# Patient Record
Sex: FEMALE | Race: WHITE | NOT HISPANIC OR LATINO | Employment: FULL TIME | URBAN - METROPOLITAN AREA
[De-identification: names, ages, dates, MRNs, and addresses within clinical notes are randomized per-mention and may not be internally consistent; named-entity substitution may affect disease eponyms.]

---

## 2020-06-15 ENCOUNTER — OFFICE VISIT (OUTPATIENT)
Dept: URGENT CARE | Facility: CLINIC | Age: 40
End: 2020-06-15
Payer: MEDICAID

## 2020-06-15 VITALS
OXYGEN SATURATION: 98 % | TEMPERATURE: 99 F | DIASTOLIC BLOOD PRESSURE: 85 MMHG | HEART RATE: 92 BPM | SYSTOLIC BLOOD PRESSURE: 130 MMHG

## 2020-06-15 DIAGNOSIS — R51.9 HEAD ACHE: ICD-10-CM

## 2020-06-15 DIAGNOSIS — Z20.822 CLOSE EXPOSURE TO COVID-19 VIRUS: Primary | ICD-10-CM

## 2020-06-15 PROCEDURE — 99201 PR OFFICE/OUTPT VISIT,NEW,LEVL I: ICD-10-PCS | Mod: S$GLB,,, | Performed by: FAMILY MEDICINE

## 2020-06-15 PROCEDURE — U0003 INFECTIOUS AGENT DETECTION BY NUCLEIC ACID (DNA OR RNA); SEVERE ACUTE RESPIRATORY SYNDROME CORONAVIRUS 2 (SARS-COV-2) (CORONAVIRUS DISEASE [COVID-19]), AMPLIFIED PROBE TECHNIQUE, MAKING USE OF HIGH THROUGHPUT TECHNOLOGIES AS DESCRIBED BY CMS-2020-01-R: HCPCS

## 2020-06-15 PROCEDURE — 99201 PR OFFICE/OUTPT VISIT,NEW,LEVL I: CPT | Mod: S$GLB,,, | Performed by: FAMILY MEDICINE

## 2020-06-15 RX ORDER — CLONAZEPAM 0.5 MG/1
0.5 TABLET ORAL 2 TIMES DAILY PRN
COMMUNITY

## 2020-06-15 RX ORDER — BUSPIRONE HYDROCHLORIDE 30 MG/1
30 TABLET ORAL 3 TIMES DAILY
Status: ON HOLD | COMMUNITY
End: 2020-11-27 | Stop reason: HOSPADM

## 2020-06-15 RX ORDER — LAMOTRIGINE 200 MG/1
200 TABLET ORAL NIGHTLY
Status: ON HOLD | COMMUNITY
End: 2020-11-27 | Stop reason: SDUPTHER

## 2020-06-15 RX ORDER — BUPROPION HYDROCHLORIDE 150 MG/1
300 TABLET ORAL EVERY MORNING
Status: ON HOLD | COMMUNITY
End: 2020-11-27 | Stop reason: HOSPADM

## 2020-06-15 RX ORDER — ZOLPIDEM TARTRATE 10 MG/1
5 TABLET ORAL NIGHTLY PRN
COMMUNITY

## 2020-06-15 RX ORDER — LAMOTRIGINE 100 MG/1
100 TABLET ORAL DAILY
Status: ON HOLD | COMMUNITY
End: 2020-11-27 | Stop reason: SDUPTHER

## 2020-06-15 NOTE — LETTER
Jyoti 15, 2020  Modesta Perez  [address]  [address]  [place] [address]                [facility] [place]  [address], [address]  [place] [address]  Phone: [phone]  Fax: [phone] Modesta Perez was seen and treated in our Urgent Care department on 6/15/2020. She may return to work in 2 - 3 days.      If you have any questions or concerns, please don't hesitate to call.        Sincerely,        Favio Blanton MD

## 2020-06-15 NOTE — PATIENT INSTRUCTIONS
Please drink plenty of fluids.  Please get plenty of rest.  Please return here or go to the Emergency Department for any concerns or worsening of condition.  If you were given wait & see antibiotics, please wait 3-5 days before taking them, and only take them if your symptoms have worsened or not improved.  If you do begin taking the antibiotics, please take them to completion.  If you were prescribed antibiotics, please take them to completion.  If you were prescribed a narcotic medication, do not drive or operate heavy equipment or machinery while taking these medications.      If not allergic, please take over the counter Tylenol (Acetaminophen) and/or Motrin (Ibuprofen) as directed for control of pain and/or fever.    Please follow up with your primary care doctor or specialist as needed.  Primary Doctor No  None    You must understand that you have received treatment at an Urgent Care facility only, and that you may be  released before all of your medical problems are known or treated. Urgent Care facilities are not equipped to  handle life threatening emergencies. It is recommended that you seek care at an Emergency Department for  further evaluation of worsening or concerning symptoms, or possibly life threatening conditions as  discussed.    Instructions for Patients with Confirmed or Suspected COVID-19    If you are awaiting your test result, you will either be called or it will be released to the patient portal.  If you have any questions about your test, please visit [email or web address] or call our COVID-19 information line at [phone].      Preventing the Spread of Coronavirus Disease 2019 (COVID-19) in Homes and Residential Communities -- Patients     Prevention steps for people with confirmed or suspected COVID-19 (including persons under investigation) who do not need to be hospitalized and people with confirmed COVID-19 who were hospitalized and determined to be medically stable to go  home.    Stay home except to get medical care.    Separate yourself from other people and animals in your home.    Call ahead before visiting your doctor.    Wear a face mask.    Cover your coughs and sneezes.    Clean your hands often.    Avoid sharing personal household items.    Clean all high-touch surfaces every day.    Monitor your symptoms. Seek prompt medical attention if your illness is worsening (e.g., difficulty breathing). Before seeking care, call your healthcare provider.    If you have a medical emergency and must call 911, notify the dispatcher that you have or are being evaluated for COVID-19. If possible, put on a face mask before emergency medical services arrive.    Use the following symptom-based strategy to return to normal activity following a suspected or confirmed case of COVID-19. Continue isolation until:   o At least 3 days (72 hours) have passed since recovery defined as resolution of fever without the use of fever-reducing medications and improvement in respiratory symptoms (e.g. cough, shortness of breath), and   o At least 10 days have passed since symptoms first appeared.     Precautions for household members, intimate partners and caregivers in a non-healthcare setting of a patient with symptomatic laboratory-confirmed COVID-19 or a patient under investigation.     Household members, intimate partners and caregivers in a non-healthcare setting may have close contact with a person with symptomatic, laboratory-confirmed COVID-19 or a person under investigation. Close contacts should monitor their health; they should call their healthcare provider right away if they develop symptoms suggestive of COVID-19 (e.g., fever, cough, shortness of breath). Close contacts should also follow these recommendations:      Make sure that you understand and can help the patient follow their healthcare providers instructions for medication(s) and care. You should help the patient with  basic needs in the home and provide support for getting groceries, prescriptions, and other personal needs.    Monitor the patients symptoms. If the patient is getting sicker, call his or her healthcare provider and tell them that the patient has laboratory-confirmed COVID-19. This will help the healthcare providers office take steps to keep people in the office or waiting room from getting infected. Ask the healthcare provider to call the local or [facility] health department for additional guidance. If the patient has a medical emergency and you need to call 911, notify the dispatch personnel that the patient has or is being evaluated for COVID-19.    Household members should stay in another room or be  from the patient as much as possible. Household members should use a separate bedroom and bathroom, if available.    Prohibit visitors who do not have an essential need to be in the home.    Household members should care for any pets. Do not handle pets or other animals while sick.    Make sure that shared spaces in the home have good air flow, such as by an air conditioner.    Perform hand hygiene frequently. Wash your hands often with soap and water for at least 20 seconds or use an alcohol-based hand  that contains 60 to 95% alcohol, covering all surfaces of your hands and rubbing them together until they feel dry. Soap and water are preferred if hands are visibly dirty.    Avoid touching your eyes, nose and mouth with unwashed hands.    The patient should wear a face mask when you are around other people. If the patient is not able to wear a face mask (for example, because it causes trouble breathing), you, as the caregiver, should wear a mask when you are in the same room as the patient.    Wear a disposable face mask and gloves when you touch or have contact with the patients blood, stool or body fluids, such as saliva, sputum, nasal mucus, vomit and urine.   o Throw out disposable  face masks and gloves after using them. Do not reuse.   o When removing personal protective equipment, first remove and dispose of gloves. Then, immediately clean your hands with soap and water or alcohol-based hand . Next, remove and dispose of face mask, and immediately clean your hands again with soap and water or alcohol-based hand .    Avoid sharing household items with the patient. You should not share dishes, drinking glasses, cups, eating utensils, towels, bedding or other items. After the patient uses these items, you should wash them thoroughly (see below Wash laundry thoroughly).    Clean all high-touch surfaces, such as counters, tabletops, doorknobs, bathroom fixtures, toilets, phones, keyboards, tablets and bedside tables, every day. Also, clean any surfaces that may have blood, stool or body fluids on them.   o Use a household cleaning spray or wipe, according to the label instructions. Labels contain instructions for safe and effective use of the cleaning product including precautions you should take when applying the product, such as wearing gloves and making sure you have good ventilation during use of the product.    Wash laundry thoroughly.   o Immediately remove and wash clothes or bedding that have blood, stool or body fluids on them.  o Wear disposable gloves while handling soiled items and keep soiled items away from your body. Clean your hands (with soap and water or an alcohol-based hand ) immediately after removing your gloves.   o Read and follow directions on labels of laundry or clothing items and detergent. In general, using a normal laundry detergent according to washing machine instructions and dry thoroughly using the warmest temperatures recommended on the clothing label.    Place all used disposable gloves, face masks and other contaminated items in a lined container before disposing of them with other household waste. Clean your hands (with soap  and water or an alcohol-based hand ) immediately after handling these items. Soap and water should be used preferentially if hands are visibly dirty.   Discuss any additional questions with your state or local health department

## 2020-06-15 NOTE — PROGRESS NOTES
Subjective:       Patient ID: Modesta Perez is a 39 y.o. female.    Vitals:  vitals were not taken for this visit.     Chief Complaint: COVID-19 Concerns    Pt states she was exposed to a positive covid.    Other  This is a new problem. Episode onset: 3 days. Associated symptoms include headaches and nausea. Pertinent negatives include no chills, congestion, coughing, fatigue, fever, sore throat or vomiting.       Constitution: Negative for chills, fatigue and fever.   HENT: Negative for congestion and sore throat.    Eyes: Negative for double vision and blurred vision.   Respiratory: Negative for cough and shortness of breath.    Gastrointestinal: Positive for nausea. Negative for vomiting and diarrhea.   Neurological: Positive for headaches. Negative for dizziness and light-headedness.       Objective:      Physical Exam   Constitutional: She is oriented to person, place, and time. She appears well-developed. She is cooperative.  Non-toxic appearance. She does not appear ill. No distress.   HENT:   Head: Normocephalic and atraumatic.   Right Ear: Hearing, tympanic membrane, external ear and ear canal normal.   Left Ear: Hearing, tympanic membrane, external ear and ear canal normal.   Nose: No mucosal edema, rhinorrhea or nasal deformity. No epistaxis. Right sinus exhibits no maxillary sinus tenderness and no frontal sinus tenderness. Left sinus exhibits no maxillary sinus tenderness and no frontal sinus tenderness.   Mouth/Throat: Uvula is midline and mucous membranes are normal. No trismus in the jaw. Normal dentition. No uvula swelling. Posterior oropharyngeal edema and posterior oropharyngeal erythema present. No oropharyngeal exudate.   Eyes: Conjunctivae and lids are normal. No scleral icterus.   Neck: Trachea normal, full passive range of motion without pain and phonation normal. Neck supple. No neck rigidity. No edema and no erythema present.   Cardiovascular: Normal rate, regular rhythm, normal  heart sounds and normal pulses.   Pulmonary/Chest: Effort normal and breath sounds normal. No respiratory distress. She has no decreased breath sounds. She has no rhonchi.   Abdominal: Normal appearance.   Musculoskeletal: Normal range of motion.         General: No deformity.   Neurological: She is alert and oriented to person, place, and time. She exhibits normal muscle tone. Coordination normal.   Skin: Skin is warm, dry, intact, not diaphoretic and not pale.   Psychiatric: Her speech is normal and behavior is normal. Judgment and thought content normal.   Nursing note and vitals reviewed.        Assessment:       1. Close Exposure to Covid-19 Virus    2. Head ache        Plan:         Close Exposure to Covid-19 Virus  -     Airborne and Contact and Droplet Isolation Status; Standing    Head ache  -     COVID-19 Routine Screening     Please drink plenty of fluids.  Please get plenty of rest.  Please return here or go to the Emergency Department for any concerns or worsening of condition.  If you were given wait & see antibiotics, please wait 3-5 days before taking them, and only take them if your symptoms have worsened or not improved.  If you do begin taking the antibiotics, please take them to completion.  If you were prescribed antibiotics, please take them to completion.  If you were prescribed a narcotic medication, do not drive or operate heavy equipment or machinery while taking these medications.      If not allergic, please take over the counter Tylenol (Acetaminophen) and/or Motrin (Ibuprofen) as directed for control of pain and/or fever.    Please follow up with your primary care doctor or specialist as needed.  Primary Doctor No  None    You must understand that you have received treatment at an Urgent Care facility only, and that you may be  released before all of your medical problems are known or treated. Urgent Care facilities are not equipped to  handle life threatening emergencies. It is  recommended that you seek care at an Emergency Department for  further evaluation of worsening or concerning symptoms, or possibly life threatening conditions as  discussed.

## 2020-06-17 LAB — SARS-COV-2 RNA RESP QL NAA+PROBE: NOT DETECTED

## 2020-06-18 ENCOUNTER — TELEPHONE (OUTPATIENT)
Dept: URGENT CARE | Facility: CLINIC | Age: 40
End: 2020-06-18

## 2020-06-18 NOTE — TELEPHONE ENCOUNTER
Pt called saying she received an e-mail about results being available. She was informed of her negative Covid results.

## 2020-11-22 ENCOUNTER — HOSPITAL ENCOUNTER (INPATIENT)
Facility: HOSPITAL | Age: 40
LOS: 5 days | Discharge: HOME | DRG: 885 | End: 2020-11-27
Attending: PSYCHIATRY & NEUROLOGY | Admitting: PSYCHIATRY & NEUROLOGY
Payer: MEDICAID

## 2020-11-22 DIAGNOSIS — F20.9 SCHIZOPHRENIA: ICD-10-CM

## 2020-11-22 DIAGNOSIS — F25.1 SCHIZOAFFECTIVE DISORDER, DEPRESSIVE TYPE: Primary | ICD-10-CM

## 2020-11-22 DIAGNOSIS — F31.9 BIPOLAR 1 DISORDER: ICD-10-CM

## 2020-11-22 PROCEDURE — 25000003 PHARM REV CODE 250: Performed by: PSYCHIATRY & NEUROLOGY

## 2020-11-22 PROCEDURE — 80175 DRUG SCREEN QUAN LAMOTRIGINE: CPT

## 2020-11-22 PROCEDURE — 80061 LIPID PANEL: CPT

## 2020-11-22 PROCEDURE — 11400000 HC PSYCH PRIVATE ROOM

## 2020-11-22 PROCEDURE — 83036 HEMOGLOBIN GLYCOSYLATED A1C: CPT

## 2020-11-22 RX ORDER — BUPROPION HYDROCHLORIDE 300 MG/1
300 TABLET ORAL EVERY MORNING
Status: DISCONTINUED | OUTPATIENT
Start: 2020-11-23 | End: 2020-11-27 | Stop reason: HOSPADM

## 2020-11-22 RX ORDER — OLANZAPINE 10 MG/2ML
10 INJECTION, POWDER, FOR SOLUTION INTRAMUSCULAR EVERY 4 HOURS PRN
Status: DISCONTINUED | OUTPATIENT
Start: 2020-11-22 | End: 2020-11-27 | Stop reason: HOSPADM

## 2020-11-22 RX ORDER — MAG HYDROX/ALUMINUM HYD/SIMETH 200-200-20
30 SUSPENSION, ORAL (FINAL DOSE FORM) ORAL EVERY 6 HOURS PRN
Status: DISCONTINUED | OUTPATIENT
Start: 2020-11-22 | End: 2020-11-27 | Stop reason: HOSPADM

## 2020-11-22 RX ORDER — BUSPIRONE HYDROCHLORIDE 10 MG/1
30 TABLET ORAL 2 TIMES DAILY
Status: DISCONTINUED | OUTPATIENT
Start: 2020-11-22 | End: 2020-11-27 | Stop reason: HOSPADM

## 2020-11-22 RX ORDER — CLONAZEPAM 0.5 MG/1
0.5 TABLET ORAL 2 TIMES DAILY PRN
Status: DISCONTINUED | OUTPATIENT
Start: 2020-11-22 | End: 2020-11-23

## 2020-11-22 RX ORDER — LOPERAMIDE HYDROCHLORIDE 2 MG/1
2 CAPSULE ORAL
Status: DISCONTINUED | OUTPATIENT
Start: 2020-11-22 | End: 2020-11-27 | Stop reason: HOSPADM

## 2020-11-22 RX ORDER — ZOLPIDEM TARTRATE 5 MG/1
5 TABLET ORAL NIGHTLY PRN
Status: DISCONTINUED | OUTPATIENT
Start: 2020-11-22 | End: 2020-11-23

## 2020-11-22 RX ORDER — LAMOTRIGINE 100 MG/1
200 TABLET ORAL NIGHTLY
Status: DISCONTINUED | OUTPATIENT
Start: 2020-11-22 | End: 2020-11-27 | Stop reason: HOSPADM

## 2020-11-22 RX ORDER — LAMOTRIGINE 100 MG/1
100 TABLET ORAL DAILY
Status: DISCONTINUED | OUTPATIENT
Start: 2020-11-23 | End: 2020-11-27 | Stop reason: HOSPADM

## 2020-11-22 RX ORDER — FOLIC ACID 1 MG/1
1 TABLET ORAL DAILY
Status: DISCONTINUED | OUTPATIENT
Start: 2020-11-23 | End: 2020-11-27 | Stop reason: HOSPADM

## 2020-11-22 RX ORDER — DOCUSATE SODIUM 100 MG/1
100 CAPSULE, LIQUID FILLED ORAL DAILY PRN
Status: DISCONTINUED | OUTPATIENT
Start: 2020-11-22 | End: 2020-11-27 | Stop reason: HOSPADM

## 2020-11-22 RX ORDER — HYDROXYZINE PAMOATE 50 MG/1
50 CAPSULE ORAL EVERY 6 HOURS PRN
Status: DISCONTINUED | OUTPATIENT
Start: 2020-11-22 | End: 2020-11-27 | Stop reason: HOSPADM

## 2020-11-22 RX ORDER — LURASIDONE HYDROCHLORIDE 40 MG/1
80 TABLET, FILM COATED ORAL
Status: DISCONTINUED | OUTPATIENT
Start: 2020-11-22 | End: 2020-11-23

## 2020-11-22 RX ORDER — OLANZAPINE 10 MG/1
10 TABLET ORAL EVERY 4 HOURS PRN
Status: DISCONTINUED | OUTPATIENT
Start: 2020-11-22 | End: 2020-11-27 | Stop reason: HOSPADM

## 2020-11-22 RX ORDER — ACETAMINOPHEN 325 MG/1
650 TABLET ORAL EVERY 6 HOURS PRN
Status: DISCONTINUED | OUTPATIENT
Start: 2020-11-22 | End: 2020-11-27 | Stop reason: HOSPADM

## 2020-11-22 RX ADMIN — BUSPIRONE HYDROCHLORIDE 30 MG: 10 TABLET ORAL at 08:11

## 2020-11-22 RX ADMIN — LAMOTRIGINE 200 MG: 100 TABLET ORAL at 08:11

## 2020-11-22 RX ADMIN — LURASIDONE HYDROCHLORIDE 80 MG: 40 TABLET, FILM COATED ORAL at 05:11

## 2020-11-22 NOTE — PLAN OF CARE
Admit Note:  Pt received from [facility] aleida DORSEY'willian by Dr Chavez.  Escorted to [facility] by tech and security via wheelchair.  Pt wanded and ambulatory on unit.  VSS.  Skin assessment/body search complete, nothing reported.  Pt cooperative during admission assessment.  Denies any S/I or H/I at this time.  Does admit to hearing voices and states she is compliant with her medications but feels they need adjusting.  Slightly suspicious maybe slightly guarded as well.  Pt has a history of inpatient psych admits in the past with a history of bipolar disorder.  Pt sees Dr Baugh for her outpatient psychiatrist.  Pt is otherwise compliant and following all instructions.  Pt oriented to unit.  Explained unit rules to pt, and instructed her to inform staff of any needs or concerns.  Pt verbalized understanding.  Will continue to monitor mood and behavior.

## 2020-11-23 LAB
CHOLEST SERPL-MCNC: 162 MG/DL (ref 120–199)
CHOLEST/HDLC SERPL: 2.5 {RATIO} (ref 2–5)
ESTIMATED AVG GLUCOSE: 82 MG/DL (ref 68–131)
HBA1C MFR BLD HPLC: 4.5 % (ref 4–5.6)
HDLC SERPL-MCNC: 64 MG/DL (ref 40–75)
HDLC SERPL: 39.5 % (ref 20–50)
LDLC SERPL CALC-MCNC: 85 MG/DL (ref 63–159)
NONHDLC SERPL-MCNC: 98 MG/DL
TRIGL SERPL-MCNC: 65 MG/DL (ref 30–150)

## 2020-11-23 PROCEDURE — 25000003 PHARM REV CODE 250: Performed by: PSYCHIATRY & NEUROLOGY

## 2020-11-23 PROCEDURE — 90833 PSYTX W PT W E/M 30 MIN: CPT | Mod: AF,HB,, | Performed by: PSYCHIATRY & NEUROLOGY

## 2020-11-23 PROCEDURE — 99222 1ST HOSP IP/OBS MODERATE 55: CPT | Mod: ,,, | Performed by: INTERNAL MEDICINE

## 2020-11-23 PROCEDURE — 99221 1ST HOSP IP/OBS SF/LOW 40: CPT | Mod: ,,, | Performed by: NURSE PRACTITIONER

## 2020-11-23 PROCEDURE — 11400000 HC PSYCH PRIVATE ROOM

## 2020-11-23 PROCEDURE — 99223 1ST HOSP IP/OBS HIGH 75: CPT | Mod: AF,HB,, | Performed by: PSYCHIATRY & NEUROLOGY

## 2020-11-23 PROCEDURE — 99221 PR INITIAL HOSPITAL CARE,LEVL I: ICD-10-PCS | Mod: ,,, | Performed by: NURSE PRACTITIONER

## 2020-11-23 PROCEDURE — 99222 PR INITIAL HOSPITAL CARE,LEVL II: ICD-10-PCS | Mod: ,,, | Performed by: INTERNAL MEDICINE

## 2020-11-23 PROCEDURE — 99223 PR INITIAL HOSPITAL CARE,LEVL III: ICD-10-PCS | Mod: AF,HB,, | Performed by: PSYCHIATRY & NEUROLOGY

## 2020-11-23 PROCEDURE — 90833 PR PSYCHOTHERAPY W/PATIENT W/E&M, 30 MIN (ADD ON): ICD-10-PCS | Mod: AF,HB,, | Performed by: PSYCHIATRY & NEUROLOGY

## 2020-11-23 PROCEDURE — 36415 COLL VENOUS BLD VENIPUNCTURE: CPT

## 2020-11-23 RX ORDER — ZOLPIDEM TARTRATE 5 MG/1
5 TABLET ORAL NIGHTLY
Status: DISCONTINUED | OUTPATIENT
Start: 2020-11-23 | End: 2020-11-27 | Stop reason: HOSPADM

## 2020-11-23 RX ORDER — CLONAZEPAM 0.5 MG/1
0.5 TABLET ORAL 2 TIMES DAILY
Status: DISCONTINUED | OUTPATIENT
Start: 2020-11-23 | End: 2020-11-27 | Stop reason: HOSPADM

## 2020-11-23 RX ORDER — ASPIRIN 325 MG
50000 TABLET, DELAYED RELEASE (ENTERIC COATED) ORAL
Status: DISCONTINUED | OUTPATIENT
Start: 2020-11-23 | End: 2020-11-27 | Stop reason: HOSPADM

## 2020-11-23 RX ADMIN — FOLIC ACID 1 MG: 1 TABLET ORAL at 09:11

## 2020-11-23 RX ADMIN — ZOLPIDEM TARTRATE 5 MG: 5 TABLET ORAL at 08:11

## 2020-11-23 RX ADMIN — CLONAZEPAM 0.5 MG: 0.5 TABLET ORAL at 08:11

## 2020-11-23 RX ADMIN — LAMOTRIGINE 100 MG: 100 TABLET ORAL at 09:11

## 2020-11-23 RX ADMIN — OFLOXACIN 50000 UNITS: 300 TABLET, COATED ORAL at 11:11

## 2020-11-23 RX ADMIN — LURASIDONE HYDROCHLORIDE 100 MG: 40 TABLET, FILM COATED ORAL at 05:11

## 2020-11-23 RX ADMIN — BUSPIRONE HYDROCHLORIDE 30 MG: 10 TABLET ORAL at 08:11

## 2020-11-23 RX ADMIN — BUPROPION HYDROCHLORIDE 300 MG: 300 TABLET, FILM COATED, EXTENDED RELEASE ORAL at 06:11

## 2020-11-23 RX ADMIN — CLONAZEPAM 0.5 MG: 0.5 TABLET ORAL at 11:11

## 2020-11-23 RX ADMIN — LAMOTRIGINE 200 MG: 100 TABLET ORAL at 08:11

## 2020-11-23 RX ADMIN — THERA TABS 1 TABLET: TAB at 09:11

## 2020-11-23 RX ADMIN — BUSPIRONE HYDROCHLORIDE 30 MG: 10 TABLET ORAL at 09:11

## 2020-11-23 NOTE — PLAN OF CARE
Pt lying quiet in bed, eyes closed, respiration even and unlabored, appearing asleep.  Slept well most all shift since 2200.  Up to bathroom at 0145 then back to sleep by 0230 and again briefly awake at 0415 when new room mate was brought into room. Safety and precautions maintained with rounds every 15 minutes, bed is fixed in a low position and pathways kept clear.  No fall occurred.

## 2020-11-23 NOTE — PLAN OF CARE
Behavior:  The patient attended psychotherapy group today. She was dressed in her own clothes and wearing a mask.    Intervention:  The Five Stages of Change was discussed in psychotherapy group this date. Patients identified what stages of change they believed that they are in using handouts P/C/P -1.2 and P/C/P - 1.1 from the Group Therapy for Substance Abuse, second edition, 2016. A Stages of Change Manual. Group discussion was then had about whether patients had a change in their life that they were thinking of making, whether related to substance abuse or not, and what stage of change they are in.    Response:  The patient was unable to participate in group today due to the psychiatrist coming to get her during group.    Plan:   To continue to encourage patient to attend group psychotherapy and to learn more about the 5 Stages of Change.

## 2020-11-23 NOTE — PROGRESS NOTES
"   11/23/20 1040   [facility] Group Therapy   Group Name Therapeutic Recreation   Specific Interventions Cognitive Stimulation Training   Participation Level Appropriate   Participation Quality Cooperative   Insight/Motivation Good   Affect/Mood Display Anxious   Cognition Alert   Psychomotor WNL   Patient reports "little anxious because I here for something simple" mood. Patient says "I didn't get a puppy, when I didn't get a puppy, I was depressed, arguing with my boyfriend, had suicidal thoughts to kill myself." Patient states "I need positive coping mechanisms.  "

## 2020-11-23 NOTE — SUBJECTIVE & OBJECTIVE
Past Medical History:   Diagnosis Date    Anxiety     Bipolar disorder        Past Surgical History:   Procedure Laterality Date    HERNIA REPAIR      HYSTERECTOMY      TUBAL LIGATION         Review of patient's allergies indicates:   Allergen Reactions    Latex, natural rubber Rash       Current Facility-Administered Medications on File Prior to Encounter   Medication    [DISCONTINUED] diphenhydrAMINE injection 50 mg    [DISCONTINUED] haloperidol lactate injection 5 mg    [DISCONTINUED] lorazepam injection 2 mg     Current Outpatient Medications on File Prior to Encounter   Medication Sig    biotin 10,000 mcg TbDL Take by mouth.    buPROPion (WELLBUTRIN XL) 150 MG TB24 tablet Take 300 mg by mouth every morning.     busPIRone (BUSPAR) 30 MG Tab Take 30 mg by mouth 3 (three) times daily.    clonazePAM (KLONOPIN) 0.5 MG tablet Take 0.5 mg by mouth 2 (two) times daily as needed for Anxiety.    lamoTRIgine (LAMICTAL) 100 MG tablet Take 100 mg by mouth once daily.    lamoTRIgine (LAMICTAL) 200 MG tablet Take 200 mg by mouth every evening.    lurasidone (LATUDA) 80 mg Tab tablet Take 80 mg by mouth every evening.    zolpidem (AMBIEN) 10 mg Tab Take 5 mg by mouth nightly as needed.     Family History     Problem Relation (Age of Onset)    Cancer Father    Diabetes Father    Hyperlipidemia Father    Hypertension Father        Tobacco Use    Smoking status: Former Smoker   Substance and Sexual Activity    Alcohol use: Not on file    Drug use: Not on file    Sexual activity: Not on file     Review of Systems   Constitutional: Negative for chills, fatigue, fever and unexpected weight change.   HENT: Negative for congestion, ear pain, sore throat and trouble swallowing.    Eyes: Negative for pain and visual disturbance.   Respiratory: Negative for cough, chest tightness and shortness of breath.    Cardiovascular: Negative for chest pain, palpitations and leg swelling.   Gastrointestinal: Negative for  abdominal distention, abdominal pain, constipation, diarrhea and vomiting.   Genitourinary: Negative for difficulty urinating, dysuria, flank pain, frequency and hematuria.   Musculoskeletal: Negative for back pain, gait problem, joint swelling, neck pain and neck stiffness.   Skin: Negative for rash and wound.   Neurological: Negative for dizziness, seizures, speech difficulty, light-headedness and headaches.   Psychiatric/Behavioral: Positive for dysphoric mood and hallucinations.     Objective:     Vital Signs (Most Recent):  Temp: 99 °F (37.2 °C) (11/23/20 0809)  Pulse: 70 (11/23/20 0809)  Resp: 18 (11/23/20 0809)  BP: 112/70 (11/23/20 0809)  SpO2: 100 % (11/22/20 1700) Vital Signs (24h Range):  Temp:  [97.8 °F (36.6 °C)-99 °F (37.2 °C)] 99 °F (37.2 °C)  Pulse:  [70-88] 70  Resp:  [18-20] 18  SpO2:  [100 %] 100 %  BP: (109-123)/(70-82) 112/70     Weight: 60.8 kg (134 lb 0.6 oz)  Body mass index is 22.31 kg/m².    Physical Exam  Vitals signs reviewed.   Constitutional:       Appearance: She is well-developed.   HENT:      Head: Normocephalic and atraumatic.   Neck:      Thyroid: No thyromegaly.   Cardiovascular:      Rate and Rhythm: Normal rate and regular rhythm.      Heart sounds: Normal heart sounds. No murmur.   Pulmonary:      Effort: Pulmonary effort is normal. No respiratory distress.      Breath sounds: Normal breath sounds. No wheezing or rales.   Abdominal:      General: Bowel sounds are normal. There is no distension.      Palpations: Abdomen is soft. There is no mass.      Tenderness: There is no abdominal tenderness.   Musculoskeletal: Normal range of motion.   Lymphadenopathy:      Cervical: No cervical adenopathy.   Skin:     General: Skin is warm and dry.      Findings: No erythema.   Neurological:      Mental Status: She is alert and oriented to person, place, and time.      Comments:   Neuro: Cranial nerves:  CN II Visual fields full to confrontation.   CN III, IV, VI Pupils are equal, round,  and reactive to light.  CN III: no palsy  Nystagmus: none   Diplopia: none  Ophthalmoparesis: none  CN V Facial sensation intact.   CN VII Facial expression full, symmetric.   CN VIII normal.   CN IX normal.   CN X normal.   CN XI normal.   CN XII normal.               Significant Labs:   UPT negative     U/A negative   UDS  Results for orders placed or performed during the hospital encounter of 11/22/20   Drug screen panel, emergency   Result Value Ref Range    Benzodiazepines Presumptive Positive     Methadone metabolites Negative     Cocaine (Metab.) Negative     Opiate Scrn, Ur Negative     Barbiturate Screen, Ur Negative     Amphetamine Screen, Ur Negative     THC Negative     Phencyclidine Negative     Creatinine, Urine 157.7 15.0 - 325.0 mg/dL    Toxicology Information SEE COMMENT      CBC  Results for orders placed or performed during the hospital encounter of 11/22/20   CBC Auto Differential   Result Value Ref Range    WBC 9.01 3.90 - 12.70 K/uL    RBC 4.12 4.00 - 5.40 M/uL    Hemoglobin 12.7 12.0 - 16.0 g/dL    Hematocrit 37.3 37.0 - 48.5 %    MCV 91 82 - 98 fL    MCH 30.8 27.0 - 31.0 pg    MCHC 34.0 32.0 - 36.0 g/dL    RDW 12.2 11.5 - 14.5 %    Platelets 327 150 - 350 K/uL    MPV 8.8 (L) 9.2 - 12.9 fL    Immature Granulocytes 0.3 0.0 - 0.5 %    Gran # (ANC) 5.7 1.8 - 7.7 K/uL    Immature Grans (Abs) 0.03 0.00 - 0.04 K/uL    Lymph # 2.4 1.0 - 4.8 K/uL    Mono # 0.6 0.3 - 1.0 K/uL    Eos # 0.3 0.0 - 0.5 K/uL    Baso # 0.04 0.00 - 0.20 K/uL    nRBC 0 0 /100 WBC    Gran % 62.7 38.0 - 73.0 %    Lymph % 26.9 18.0 - 48.0 %    Mono % 6.8 4.0 - 15.0 %    Eosinophil % 2.9 0.0 - 8.0 %    Basophil % 0.4 0.0 - 1.9 %    Differential Method Automated      CMP  Results for orders placed or performed during the hospital encounter of 11/22/20   Comprehensive Metabolic Panel   Result Value Ref Range    Sodium 142 136 - 145 mmol/L    Potassium 3.9 3.5 - 5.1 mmol/L    Chloride 108 95 - 110 mmol/L    CO2 25 23 - 29 mmol/L     Glucose 90 70 - 110 mg/dL    BUN 13 6 - 20 mg/dL    Creatinine 1.0 0.5 - 1.4 mg/dL    Calcium 9.0 8.7 - 10.5 mg/dL    Total Protein 7.2 6.0 - 8.4 g/dL    Albumin 4.5 3.5 - 5.2 g/dL    Total Bilirubin 0.4 0.1 - 1.0 mg/dL    Alkaline Phosphatase 47 (L) 55 - 135 U/L    AST 14 10 - 40 U/L    ALT 21 10 - 44 U/L    Anion Gap 9 8 - 16 mmol/L    eGFR if African American >60 >60 mL/min/1.73 m^2    eGFR if non African American >60 >60 mL/min/1.73 m^2     TSH  Results for orders placed or performed during the hospital encounter of 11/22/20   TSH   Result Value Ref Range    TSH 1.819 0.400 - 4.000 uIU/mL     ETOH  Results for orders placed or performed during the hospital encounter of 11/22/20   Ethanol   Result Value Ref Range    Alcohol, Serum <10 <10 mg/dL     Salicylate  Results for orders placed or performed during the hospital encounter of 11/22/20   Salicylate Level   Result Value Ref Range    Salicylate Lvl <5.0 (L) 15.0 - 30.0 mg/dL     Acetaminophen  Results for orders placed or performed during the hospital encounter of 11/22/20   Acetaminophen Level   Result Value Ref Range    Acetaminophen (Tylenol), Serum <3.0 (L) 10.0 - 20.0 ug/mL

## 2020-11-23 NOTE — HPI
38 yo female patient with hx of bipolar and hallucinations. Admitted to U and medicine was consulted for H/P. VSS/afebrile. Labs reviewed

## 2020-11-23 NOTE — CONSULTS
[facility] Medicine  Consult Note    Patient Name: Modesta Perez  MRN: [id number]  Admission Date: 11/22/2020  Hospital Length of Stay: 1 days  Attending Physician: Sundar Cantu MD   Primary Care Provider: Primary Doctor No           Patient information was obtained from patient and ER records.     Inpatient consult to [facility] for History and Physical  Consult performed by: Christin Méndez NP  Consult ordered by: Sundar Cantu MD        Subjective:     Principal Problem: Schizophrenia    Chief Complaint: No chief complaint on file.       HPI: 38 yo female patient with hx of bipolar and hallucinations. Admitted to [facility] and medicine was consulted for H/P. VSS/afebrile. Labs reviewed    Past Medical History:   Diagnosis Date    Anxiety     Bipolar disorder        Past Surgical History:   Procedure Laterality Date    HERNIA REPAIR      HYSTERECTOMY      TUBAL LIGATION         Review of patient's allergies indicates:   Allergen Reactions    Latex, natural rubber Rash       Current Facility-Administered Medications on File Prior to Encounter   Medication    [DISCONTINUED] diphenhydrAMINE injection 50 mg    [DISCONTINUED] haloperidol lactate injection 5 mg    [DISCONTINUED] lorazepam injection 2 mg     Current Outpatient Medications on File Prior to Encounter   Medication Sig    biotin 10,000 mcg TbDL Take by mouth.    buPROPion (WELLBUTRIN XL) 150 MG TB24 tablet Take 300 mg by mouth every morning.     busPIRone (BUSPAR) 30 MG Tab Take 30 mg by mouth 3 (three) times daily.    clonazePAM (KLONOPIN) 0.5 MG tablet Take 0.5 mg by mouth 2 (two) times daily as needed for Anxiety.    lamoTRIgine (LAMICTAL) 100 MG tablet Take 100 mg by mouth once daily.    lamoTRIgine (LAMICTAL) 200 MG tablet Take 200 mg by mouth every evening.    lurasidone (LATUDA) 80 mg Tab tablet Take 80 mg by mouth every evening.    zolpidem (AMBIEN) 10 mg Tab Take 5 mg by mouth  nightly as needed.     Family History     Problem Relation (Age of Onset)    Cancer Father    Diabetes Father    Hyperlipidemia Father    Hypertension Father        Tobacco Use    Smoking status: Former Smoker   Substance and Sexual Activity    Alcohol use: Not on file    Drug use: Not on file    Sexual activity: Not on file     Review of Systems   Constitutional: Negative for chills, fatigue, fever and unexpected weight change.   HENT: Negative for congestion, ear pain, sore throat and trouble swallowing.    Eyes: Negative for pain and visual disturbance.   Respiratory: Negative for cough, chest tightness and shortness of breath.    Cardiovascular: Negative for chest pain, palpitations and leg swelling.   Gastrointestinal: Negative for abdominal distention, abdominal pain, constipation, diarrhea and vomiting.   Genitourinary: Negative for difficulty urinating, dysuria, flank pain, frequency and hematuria.   Musculoskeletal: Negative for back pain, gait problem, joint swelling, neck pain and neck stiffness.   Skin: Negative for rash and wound.   Neurological: Negative for dizziness, seizures, speech difficulty, light-headedness and headaches.   Psychiatric/Behavioral: Positive for dysphoric mood and hallucinations.     Objective:     Vital Signs (Most Recent):  Temp: 99 °F (37.2 °C) (11/23/20 0809)  Pulse: 70 (11/23/20 0809)  Resp: 18 (11/23/20 0809)  BP: 112/70 (11/23/20 0809)  SpO2: 100 % (11/22/20 1700) Vital Signs (24h Range):  Temp:  [97.8 °F (36.6 °C)-99 °F (37.2 °C)] 99 °F (37.2 °C)  Pulse:  [70-88] 70  Resp:  [18-20] 18  SpO2:  [100 %] 100 %  BP: (109-123)/(70-82) 112/70     Weight: 60.8 kg (134 lb 0.6 oz)  Body mass index is 22.31 kg/m².    Physical Exam  Vitals signs reviewed.   Constitutional:       Appearance: She is well-developed.   HENT:      Head: Normocephalic and atraumatic.   Neck:      Thyroid: No thyromegaly.   Cardiovascular:      Rate and Rhythm: Normal rate and regular rhythm.      Heart  sounds: Normal heart sounds. No murmur.   Pulmonary:      Effort: Pulmonary effort is normal. No respiratory distress.      Breath sounds: Normal breath sounds. No wheezing or rales.   Abdominal:      General: Bowel sounds are normal. There is no distension.      Palpations: Abdomen is soft. There is no mass.      Tenderness: There is no abdominal tenderness.   Musculoskeletal: Normal range of motion.   Lymphadenopathy:      Cervical: No cervical adenopathy.   Skin:     General: Skin is warm and dry.      Findings: No erythema.   Neurological:      Mental Status: She is alert and oriented to person, place, and time.      Comments:   Neuro: Cranial nerves:  CN II Visual fields full to confrontation.   CN III, IV, VI Pupils are equal, round, and reactive to light.  CN III: no palsy  Nystagmus: none   Diplopia: none  Ophthalmoparesis: none  CN V Facial sensation intact.   CN VII Facial expression full, symmetric.   CN VIII normal.   CN IX normal.   CN X normal.   CN XI normal.   CN XII normal.               Significant Labs:   UPT negative     U/A negative   UDS  Results for orders placed or performed during the hospital encounter of 11/22/20   Drug screen panel, emergency   Result Value Ref Range    Benzodiazepines Presumptive Positive     Methadone metabolites Negative     Cocaine (Metab.) Negative     Opiate Scrn, Ur Negative     Barbiturate Screen, Ur Negative     Amphetamine Screen, Ur Negative     THC Negative     Phencyclidine Negative     Creatinine, Urine 157.7 15.0 - 325.0 mg/dL    Toxicology Information SEE COMMENT      CBC  Results for orders placed or performed during the hospital encounter of 11/22/20   CBC Auto Differential   Result Value Ref Range    WBC 9.01 3.90 - 12.70 K/uL    RBC 4.12 4.00 - 5.40 M/uL    Hemoglobin 12.7 12.0 - 16.0 g/dL    Hematocrit 37.3 37.0 - 48.5 %    MCV 91 82 - 98 fL    MCH 30.8 27.0 - 31.0 pg    MCHC 34.0 32.0 - 36.0 g/dL    RDW 12.2 11.5 - 14.5 %    Platelets 327 150 - 350  K/uL    MPV 8.8 (L) 9.2 - 12.9 fL    Immature Granulocytes 0.3 0.0 - 0.5 %    Gran # (ANC) 5.7 1.8 - 7.7 K/uL    Immature Grans (Abs) 0.03 0.00 - 0.04 K/uL    Lymph # 2.4 1.0 - 4.8 K/uL    Mono # 0.6 0.3 - 1.0 K/uL    Eos # 0.3 0.0 - 0.5 K/uL    Baso # 0.04 0.00 - 0.20 K/uL    nRBC 0 0 /100 WBC    Gran % 62.7 38.0 - 73.0 %    Lymph % 26.9 18.0 - 48.0 %    Mono % 6.8 4.0 - 15.0 %    Eosinophil % 2.9 0.0 - 8.0 %    Basophil % 0.4 0.0 - 1.9 %    Differential Method Automated      CMP  Results for orders placed or performed during the hospital encounter of 11/22/20   Comprehensive Metabolic Panel   Result Value Ref Range    Sodium 142 136 - 145 mmol/L    Potassium 3.9 3.5 - 5.1 mmol/L    Chloride 108 95 - 110 mmol/L    CO2 25 23 - 29 mmol/L    Glucose 90 70 - 110 mg/dL    BUN 13 6 - 20 mg/dL    Creatinine 1.0 0.5 - 1.4 mg/dL    Calcium 9.0 8.7 - 10.5 mg/dL    Total Protein 7.2 6.0 - 8.4 g/dL    Albumin 4.5 3.5 - 5.2 g/dL    Total Bilirubin 0.4 0.1 - 1.0 mg/dL    Alkaline Phosphatase 47 (L) 55 - 135 U/L    AST 14 10 - 40 U/L    ALT 21 10 - 44 U/L    Anion Gap 9 8 - 16 mmol/L    eGFR if African American >60 >60 mL/min/1.73 m^2    eGFR if non African American >60 >60 mL/min/1.73 m^2     TSH  Results for orders placed or performed during the hospital encounter of 11/22/20   TSH   Result Value Ref Range    TSH 1.819 0.400 - 4.000 uIU/mL     ETOH  Results for orders placed or performed during the hospital encounter of 11/22/20   Ethanol   Result Value Ref Range    Alcohol, Serum <10 <10 mg/dL     Salicylate  Results for orders placed or performed during the hospital encounter of 11/22/20   Salicylate Level   Result Value Ref Range    Salicylate Lvl <5.0 (L) 15.0 - 30.0 mg/dL     Acetaminophen  Results for orders placed or performed during the hospital encounter of 11/22/20   Acetaminophen Level   Result Value Ref Range    Acetaminophen (Tylenol), Serum <3.0 (L) 10.0 - 20.0 ug/mL             Assessment/Plan:     *  Schizophrenia  Further orders per psych         VTE Risk Mitigation (From admission, onward)    None              Thank you for your consult. I will sign off. Please contact us if you have any additional questions.    Christin Méndez NP  Department of Hospital Medicine   [facility]

## 2020-11-23 NOTE — PLAN OF CARE
Recommendation:   1. Continue regular diet   2. Encourage appropriate intake of meals as needed     Goals: consume at least 75% of all meals by f/u  Nutrition Goal Status: new    Interventions:  General Healthful Diet    Nutrition Discharge Planning: Regular Diet

## 2020-11-23 NOTE — PLAN OF CARE
Pt out on unit.Pt reports poor sleep last night which she blames on not getting her Ambien.Appetite good.Grooming fair.Pt tearful and eyes puffy.Pt upset that she may not be home for Thanksgiving.Participating in unit activities.Medication compliant.
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
## 2020-11-23 NOTE — CONSULTS
"  [facility]  Adult Nutrition  Consult Note    SUMMARY     Recommendations    Recommendation:   1. Continue regular diet   2. Encourage appropriate intake of meals as needed     Goals: consume at least 75% of all meals by f/u  Nutrition Goal Status: new  Communication of RD Recs: (POC)    Reason for Assessment    Reason For Assessment: consult  Diagnosis: psychological disorder  Relevant Medical History: anxiety, bipolar disorder    General Information Comments: Pt admitted last night; she consumed 0% of dinner, 100% of HS snack, and 100% of breakfast this AM. No signs of significant wt changes at this time, wt WNL. NFPE not performed per psych status.     Nutrition Discharge Planning: Regular Diet    Nutrition Risk Screen    Nutrition Risk Screen: no indicators present    Nutrition/Diet History    Food Allergies: NKFA  Factors Affecting Nutritional Intake: None identified at this time    Anthropometrics    Temp: 99 °F (37.2 °C)  Height Method: Measured  Height: 5' 5" (165.1 cm)  Height (inches): 65 in  Weight Method: Standard Scale  Weight: 60.8 kg (134 lb 0.6 oz)  Weight (lb): 134.04 lb  Ideal Body Weight (IBW), Female: 125 lb  % Ideal Body Weight, Female (lb): 107.23 %  BMI (Calculated): 22.3  BMI Grade: 18.5-24.9 - normal     Lab/Procedures/Meds    Pertinent Labs Reviewed: reviewed  Pertinent Labs Comments: Alk phos 47  Pertinent Medications Reviewed: reviewed  Pertinent Medications Comments: folic caid, MVI    Estimated/Assessed Needs    Weight Used For Calorie Calculations: 60.8 kg (134 lb 0.6 oz)  Energy Calorie Requirements (kcal): 1782  Energy Need Method: [place](*1.4)  Protein Requirements: 48-60 g(.8-1 g/kg)  Weight Used For Protein Calculations: 60.8 kg (134 lb 0.6 oz)     Estimated Fluid Requirement Method: RDA Method  RDA Method (mL): 1782     Nutrition Prescription Ordered    Current Diet Order: Regular Diet    Evaluation of Received Nutrient/Fluid Intake    Fluid " Required: meeting needs  % Intake of Estimated Energy Needs: 50 - 75 %  % Meal Intake: 0 or 100% (2 meals)    Nutrition Risk    Level of Risk/Frequency of Follow-up: low - moderate     Assessment and Plan  Nutrition Problem:  Inadequate energy intake    Related to (etiology):   anxiety    Signs and Symptoms (as evidenced by):   Pt consumed 0% of dinner last night and 100% of breakfast this AM    Interventions:  General Healthful Diet    Nutrition Diagnosis Status:   New      Monitor and Evaluation    Food and Nutrient Intake: energy intake, food and beverage intake  Food and Nutrient Adminstration: diet order  Anthropometric Measurements: weight, weight change  Biochemical Data, Medical Tests and Procedures: (liver function)  Nutrition-Focused Physical Findings: overall appearance     Nutrition Follow-Up    RD Follow-up?: Yes

## 2020-11-23 NOTE — PLAN OF CARE
Up and about the unit.  Spent time in the dayroom watching TV.  Pt is calm, quiet, cooperative and guarded.  Little interaction with peers.  Endorses auditory hallucinations but denies any SI/HI.  Showered this evening.  Took PM meds without any problems.  All precautions maintained.

## 2020-11-23 NOTE — PLAN OF CARE
"Plan of care reviewed.  Denies intent to harm self or others and denies psychosis at this time.  Accepts snacks and medications.  Gait steady, no falls.  Adjusting to unit, interacting with staff when addressed but is somewhat  withdrawn from peers.  States "I'm only here because I couldn't get my puppy.",  poor insight/judgement.  Promoted an individualized safety plan, reality-based interactions, effective coping strategies, and impulse control.  Will continue to monitor for safety.        "

## 2020-11-23 NOTE — PROGRESS NOTES
"   11/23/20 1516   Assessment   Patient's Identification of the Problem Patient presents anxious and reports "neutral" mood. Patient states her admit is due to "I was under the impression I was getting a puppy and didn't get one. I got really depressed, my boyfriend moved all kinds of stuff in the kitchen, that upset me, then my boyfriend  wanted to leave, I got enraged and I wanted to hurt myself." Patient states "when I was mad I heard a voice say, I just don't want to be here." Patient states she does not know if she heard a voice of if it was her conscious. Patient denies drugs or alcohol use, reports a history of alcohol use. Patient reports she is , in a relationship, have 2 children, finished cosmtripJanelogy school, employed as a , has an apartment in [place], lives with her boyfriend in [place]. Patient verbalized main goal "I want to be able to cope with my Bipolar systems, think of positive ways to tune those negative thoughts into positive thinking."   Leisure Interest Watching TV;Reading;Shopping;Listen to Music;Walk;House Chores;Fishing;Ride Bike;Enjoy Family/Friends;Adventism  (lencho, hiking)   Leisure Barriers Lack of Finances;Fears/Phobias;Other (See Comments)  (bad knee left,fear snakes and rats)   Treatment Focus To Improve Mood;To Decrease Agitation and Increase Frustration Tolerance;To Improve Leisure Awareness/Lifestyle/Interest;To Promote Successful and Safe Self Expression;To Improve Coping Skills     Treatment Recommendation:   1:1 Intervention (as needed)    Cognitive Stimulation Skilled Activity  Creative Expression Skilled Activity  Mild Exercises Skilled Activity  Stress Management Skilled Activity  Coping Skilled Activity  Leisure Education and Awareness Skilled Activity    Treatment Goal(s):  Long Term Goals Refer To Master Treatment Plan    Short Term Treatment Goal(s)  Patient Will:  Exhibit Improvement in Mood  Demonstrate Constructive Expression of Feelings and " Behavior  Identify at Least 2 Coping Skills or Leisure Skills to Reduce Depression and Hopelessness Upon Request from Therapist    Discharge Recommendations:  Encourage Patient to Utilize Coping Skills on a Regular Basis to Reduce the Risk of Decompensating and Re-Hospitalizations  Follow Up with After Care Appointments  Continue with Current Leisure Activities

## 2020-11-24 PROCEDURE — 99233 SBSQ HOSP IP/OBS HIGH 50: CPT | Mod: AF,HB,, | Performed by: STUDENT IN AN ORGANIZED HEALTH CARE EDUCATION/TRAINING PROGRAM

## 2020-11-24 PROCEDURE — 11400000 HC PSYCH PRIVATE ROOM

## 2020-11-24 PROCEDURE — 99233 PR SUBSEQUENT HOSPITAL CARE,LEVL III: ICD-10-PCS | Mod: AF,HB,, | Performed by: STUDENT IN AN ORGANIZED HEALTH CARE EDUCATION/TRAINING PROGRAM

## 2020-11-24 PROCEDURE — 25000003 PHARM REV CODE 250: Performed by: PSYCHIATRY & NEUROLOGY

## 2020-11-24 RX ADMIN — LURASIDONE HYDROCHLORIDE 100 MG: 40 TABLET, FILM COATED ORAL at 05:11

## 2020-11-24 RX ADMIN — BUSPIRONE HYDROCHLORIDE 30 MG: 10 TABLET ORAL at 08:11

## 2020-11-24 RX ADMIN — FOLIC ACID 1 MG: 1 TABLET ORAL at 09:11

## 2020-11-24 RX ADMIN — CLONAZEPAM 0.5 MG: 0.5 TABLET ORAL at 09:11

## 2020-11-24 RX ADMIN — LAMOTRIGINE 200 MG: 100 TABLET ORAL at 08:11

## 2020-11-24 RX ADMIN — BUPROPION HYDROCHLORIDE 300 MG: 300 TABLET, FILM COATED, EXTENDED RELEASE ORAL at 06:11

## 2020-11-24 RX ADMIN — LAMOTRIGINE 100 MG: 100 TABLET ORAL at 09:11

## 2020-11-24 RX ADMIN — THERA TABS 1 TABLET: TAB at 09:11

## 2020-11-24 RX ADMIN — CLONAZEPAM 0.5 MG: 0.5 TABLET ORAL at 08:11

## 2020-11-24 RX ADMIN — BUSPIRONE HYDROCHLORIDE 30 MG: 10 TABLET ORAL at 09:11

## 2020-11-24 RX ADMIN — ZOLPIDEM TARTRATE 5 MG: 5 TABLET ORAL at 08:11

## 2020-11-24 NOTE — PLAN OF CARE
Up and about the unit.  Spending time in the dayroom watching TV and talking with peers.  No complaints.  Calm and cooperative.  Interacting well/appropriately with peers and staff.  Denies SI.  Denies AH.  Seems to be settling in to the unit.  Took PM meds without difficulty.  All precautions maintained.
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
## 2020-11-24 NOTE — PLAN OF CARE
Behavior:  The patient attended and participated in psychotherapy group this date. She was dressed in her own clothes and wearing a face mask.    Intervention:  Coping skills was discussed in psychotherapy group this date with an emphasis on encouraging patients to put knowledge strategies, and skills into practice. 2. Assist patients in identifying positive coping skills to better deal with stress and avoid abusing substances.  Response:  The patient stated that her coping skills are to exercise, play with a pet, pray, go shopping, and to give herself a facial.    Plan:  To continue to encourage patient to attend group psychotherapy and to learn about ways that she may put knowledge, strategies, and positive coping skills into practice.

## 2020-11-24 NOTE — PLAN OF CARE
Collateral Contact Information    Document information pertaining to:      Reason for admission - describe what happened  LMSW spoke to the patient's boyfriend, Chong Douglas, [phone]. He stated that he was not there when it happened, but he thinks that the patient was upset over the fact that she had decided not to get a new puppy. His mother called him and told him that the patient was hysterically crying and that he needed to get over there right away. Chong sated that the patient has a tough time dealing with disappointment. He stated that she is sad that she may be missing Thanksgiving.    Prior treatment places and dates  He has only been with her for a year and a half, so he doesn't know.    Reason for prior treatment - same or different   How long has patient had problem (s)    Substance abuse- what, how much, how often and how long?  Not that he knows of.    Legal issues - Current charges, type of offense, probation or parole.  Not that he knows of.    History of suicide attempt - when and what methods, did they require medical attention  Not that he knows of.    Impulse issues -     History of violence - describe behaviors and triggers  Not that he knows of.    Any guns or weapons in the home? If yes, recommend that these be secured.  Yes, but they are locked in a case, and t he patient does not have access.    What is patient's Baseline behavior / mood - how do they know if patient is doing well?  The patient is cheerful and has energy to do things when she is well.    What helps the patient stay well?    Internal / external coping strategies (attending meetings, going to groups,    Taking medications, spending time with family, etc.)     Discharge plan:  Where will the patient live upon discharge?  With Chong    Who else is in the home?  Chong's 19-year-old son.    Will someone be able to pick the patient up when discharged?  Either he or his mother will pick the patient up when she is  discharged

## 2020-11-24 NOTE — PLAN OF CARE
The patient will have outpatient counseling at [facility]. Her boyfriend or his mother will provide transportation home when she is discharged.

## 2020-11-24 NOTE — PROGRESS NOTES
"   11/24/20 1040   [facility] Group Therapy   Group Name Therapeutic Recreation   Specific Interventions Cognitive Stimulation Training   Participation Level Appropriate;Attentive;Sharing   Participation Quality Cooperative   Insight/Motivation Good   Affect/Mood Display Appropriate   Cognition Alert   Psychomotor WNL   Patient reports "good" mood, shows interest and initial ability to understand directions. Patient says she need coping skills to help her when angry, given a list of Self Talk For Decreasing Anger, to help redirect anger. Discuss and identified benefits(relaxation, decrease stress, socialization being creative, mental stimulation and promote physical fitness) and reading affirmations & positive statements could help improve coping skills. Patient encouraged to participate in healthy activities.  "

## 2020-11-24 NOTE — PLAN OF CARE
Pt calm, cooperative and polite. Interacting well with staff and peers. Meal and medication compliant. Attended and participated in groups. Mood showing improvement. Denies SI,  HI and AVH. NAD. Will continue to monitor for safety.

## 2020-11-24 NOTE — PLAN OF CARE
Pt is sleeping at this time and has slept 8 hours uniterrupted.  NAD.  Resp even & unlabored.  Pathways clear and bed in low position.  Q 15 minute safety checks ongoing.  All precautions maintained

## 2020-11-24 NOTE — PROGRESS NOTES
"PSYCHIATRY DAILY INPATIENT PROGRESS NOTE  SUBSEQUENT HOSPITAL VISIT    ENCOUNTER DATE: 11/24/2020  SITE: [facility]    DATE OF ADMISSION: 11/22/2020  4:50 PM  LENGTH OF STAY: 2 days      HISTORY    CHIEF COMPLAINT   Modesta Perez is a 39 y.o. female, seen during daily quiroz rounds on the inpatient unit.  Modesta Perez presents with the chief complaint of depression, SI    HPI:    The patient was seen and examined. The chart was reviewed.     Reviewed notes by CTRS, NP, MD,  from the last 24 hours.    The patient's case was discussed with the treatment team and care providers today.    Staff reports no behavioral or management issues.     The patient has been compliant with treatment. The patient denied any side effects.        Subjective 11/24/2020  Reports symptoms are improving and medication changes yesterday were helpful. She had no other complaints. Discussed hallucinations vs. Internal monologue with patient. Slept 8 hours last night per cahrt.    Per CTRS:  "I was under the impression I was getting a puppy and didn't get one. I got really depressed, my boyfriend moved all kinds of stuff in the kitchen, that upset me, then my boyfriend  wanted to leave, I got enraged and I wanted to hurt myself."         Depressed mood - trend: none  Interest/pleasure/anhedonia: trend: none  Guilt/hopelessness/worthlessness - trend: none  Changes in Sleep - trend: decreasing steadily  Changes in Appetite - trend: none   Changes in Concentrations - trend: decreasing steadily  Changes in Energy - trend: decreasing steadily  Suicidal- active/passive ideations - trend: decreasing, no plan or intent currently  Homicidal ideations: active/passive ideations - trend: none    Hallucinations - trend: decreasing steadily  Paranoia - trend: none  Delusions - trend: none  Disorganized behavior/speech - trend: none  Negative symptoms - trend: none    Elevated mood - trend: decreasing steadily  Racing thoughts - trend: " decreasing steadily  Impulsivity - trend: decreasing steadily  Agitation - trend: none  Irritability- trend: decreasing steadily    Symptoms of YUDITH - trend: fluctuating a bit  Symptoms of Panic Disorder- trend: none  Symptoms of PTSD - trend: decreasing steadily                ROS  Review of Systems   Constitutional: Negative for chills and fever.   HENT: Negative for hearing loss.    Eyes: Negative for blurred vision and double vision.   Respiratory: Negative for shortness of breath.    Cardiovascular: Negative for chest pain and palpitations.   Gastrointestinal: Negative for nausea and vomiting.   Genitourinary: Negative for dysuria.   Musculoskeletal: Negative for back pain and joint pain.   Skin: Negative for rash.   Neurological: Negative for dizziness and headaches.   Endo/Heme/Allergies: Negative for environmental allergies.         PAST MEDICAL HISTORY   Past Medical History:   Diagnosis Date    Anxiety     Bipolar disorder            PSYCHOTROPIC MEDICATIONS   Scheduled Meds:   buPROPion  300 mg Oral QAM    busPIRone  30 mg Oral BID    cholecalciferol (vitamin D3)  50,000 Units Oral Q7 Days    clonazePAM  0.5 mg Oral BID    folic acid  1 mg Oral Daily    lamoTRIgine  100 mg Oral Daily    lamoTRIgine  200 mg Oral QHS    lurasidone  100 mg Oral Daily with dinner    multivitamin  1 tablet Oral Daily    zolpidem  5 mg Oral QHS     Continuous Infusions:  PRN Meds:.acetaminophen, aluminum-magnesium hydroxide-simethicone, docusate sodium, hydrOXYzine pamoate, loperamide, OLANZapine **AND** OLANZapine        EXAMINATION    VITALS   Vitals:    11/24/20 0800   BP: 110/69   Pulse: 81   Resp: 16   Temp: 98.6 °F (37 °C)         CONSTITUTIONAL  General Appearance: unremarkable, age appropriate    MUSCULOSKELETAL  Muscle Strength and Tone:no tremor, no tic  Abnormal Involuntary Movements: No  Gait and Station: non-ataxic    PSYCHIATRIC   Level of Consciousness: awake and alert   Orientation: person, place and  situation  Grooming: Casually dressed and Well groomed  Psychomotor Behavior: normal, cooperative  Speech: normal tone, normal rate, normal pitch, normal volume  Language: grossly intact  Mood: anxious  Affect: Consistent with mood  Thought Process: linear, logical  Associations: intact   Thought Content: no delusions  Perceptions: less AH  Memory: Able to recall past events, Remote intact and Recent intact  Attention:Attends to interview without distraction  Fund of Knowledge: Aware of current events and Vocabulary appropriate   Estimate if Intelligence:  Average based on work/education history, vocabulary and mental status exam  Insight: has awareness of illness  Judgment: behavior is adequate to circumstances          DIAGNOSTIC TESTING   Laboratory Results  No results found for this or any previous visit (from the past 24 hour(s)).    ASSESSMENT      IMPRESSION   Bipolar I Disorder mre manic, severe with psychotic features  Insomnia secondary to a mental illness  YUDITH  Panic Attacks  PTSD     Psychosocial stressors     Vitamin D deficiency               MEDICAL DECISION MAKING          PROBLEM LIST AND MANAGEMENT PLANS; PRESCRIPTION DRUG MANAGEMENT  Compliance: yes  Side Effects: no  Regimen Adjustments:      Bipolar disorder: pt counseled  -resumed Wellbutrin  mg po q day; pt counseled on risk for worsening yasemin but she would like to continue if able as the B>R, and her previous depression were severe; will change/adjustment as needed  Resumed Lamictal at 100mg po q Am and 200 mg po q HS- check level (pending)  -Resumed Latuda at 80 mg po q day with dinner- increased to 100 mg po q day with dinner     Anxiety: pt counseled  -wellbutrin off-label as above  -resume buspar at 30 mg po BID  -resume klonopin at 0.5 mg po BID     Insomnia: pt counseled  -resume ambien at 5 mg po q HS     PTSD: pt counseled  -consider trial of prazosin     Psychosocial stressors: pt counseled  -SW consulted to assist with  resources     Vitamin D deficiency: pt counseled  Start Vitamin D3 50,000 units po q week x 8 weeks; 1/8 completed       PRESCRIPTION DRUG MANAGEMENT  Compliance: Yes  Side Effects: No  Regimen Adjustments: see above      Discussed diagnosis, risks and benefits of proposed treatment vs alternative treatments vs no treatment, potential side effects of these treatments and the inherent unpredictability of treatment. The patient expresses understanding of the above and displays the capacity to agree with this treatment given said understanding. Patient also agrees that, currently, the benefits outweigh the risks and would like to pursue/continue treatment at this time.      DISCHARGE PLANNING  Expected Disposition Plan: Home or Self Care      NEED FOR CONTINUED HOSPITALIZATION  Psychiatric illness continues to pose a potential threat to life or bodily function, of self or others, thereby requiring the need for continued inpatient psychiatric hospitalization: Yes as evidenced by : danger to self    Protective inpatient pyschiatric hospitalization required while a safe disposition plan is enacted: Yes    Patient stabilized and ready for discharge from inpatient psychiatric unit: No      STAFF:   Yosi Richardson III, MD  Psychiatry

## 2020-11-25 LAB — LAMOTRIGINE SERPL-MCNC: 4.6 UG/ML (ref 2–15)

## 2020-11-25 PROCEDURE — 99233 SBSQ HOSP IP/OBS HIGH 50: CPT | Mod: AF,HB,, | Performed by: STUDENT IN AN ORGANIZED HEALTH CARE EDUCATION/TRAINING PROGRAM

## 2020-11-25 PROCEDURE — 99233 PR SUBSEQUENT HOSPITAL CARE,LEVL III: ICD-10-PCS | Mod: AF,HB,, | Performed by: STUDENT IN AN ORGANIZED HEALTH CARE EDUCATION/TRAINING PROGRAM

## 2020-11-25 PROCEDURE — 11400000 HC PSYCH PRIVATE ROOM

## 2020-11-25 PROCEDURE — 25000003 PHARM REV CODE 250: Performed by: PSYCHIATRY & NEUROLOGY

## 2020-11-25 RX ADMIN — ACETAMINOPHEN 650 MG: 325 TABLET ORAL at 05:11

## 2020-11-25 RX ADMIN — LAMOTRIGINE 200 MG: 100 TABLET ORAL at 08:11

## 2020-11-25 RX ADMIN — BUPROPION HYDROCHLORIDE 300 MG: 300 TABLET, FILM COATED, EXTENDED RELEASE ORAL at 07:11

## 2020-11-25 RX ADMIN — CLONAZEPAM 0.5 MG: 0.5 TABLET ORAL at 08:11

## 2020-11-25 RX ADMIN — FOLIC ACID 1 MG: 1 TABLET ORAL at 09:11

## 2020-11-25 RX ADMIN — LURASIDONE HYDROCHLORIDE 100 MG: 40 TABLET, FILM COATED ORAL at 04:11

## 2020-11-25 RX ADMIN — ZOLPIDEM TARTRATE 5 MG: 5 TABLET ORAL at 08:11

## 2020-11-25 RX ADMIN — LAMOTRIGINE 100 MG: 100 TABLET ORAL at 09:11

## 2020-11-25 RX ADMIN — THERA TABS 1 TABLET: TAB at 09:11

## 2020-11-25 RX ADMIN — BUSPIRONE HYDROCHLORIDE 30 MG: 10 TABLET ORAL at 08:11

## 2020-11-25 NOTE — PLAN OF CARE
Behavior:  The patient attended and participated in psychotherapy group today. She was dressed in her own clothes and wearing a mask.    Intervention:  Personal values were discussed in psychotherapy group this date and how personal values plays a part in patients' lives. Patients identified values that are important to them using handout P/C/P - 7.1 from the Group Therapy for Substance Abuse, second edition, 2016. A Stages of Change Manual. Group discussion was had about patients' values, substance abuse behaviors that may not line up with their values, and how they might make some changes.    Response:  The patient stated that her most important values are self-control, compassion, safety, romance, and family.    Plan:   To continue to encourage patient to attend group psychotherapy and to learn more about ways that they might change to live in more accord with their values.

## 2020-11-25 NOTE — PLAN OF CARE
Shift note : patient is eating all meals and is taking all ordered medications . She is attending all groups to improve her coping skills . She is cooperative . States that she is feeling better than she was feeling when she came in .

## 2020-11-25 NOTE — PLAN OF CARE
Pt is sleeping at this time and has slept 7 hours with one awakening to toilet.  NAD.  Resp even & unlabored.  Pathways clear and bed in low position.  Q 15 minute safety checks ongoing.  All precautions maintained

## 2020-11-25 NOTE — PROGRESS NOTES
11/25/20 1040   [facility] Group Therapy   Group Name Therapeutic Recreation   Specific Interventions Cognitive Stimulation Training  (benefits of leisure activities)   Participation Level Appropriate;Attentive;Sharing   Participation Quality Cooperative   Insight/Motivation Applies New Skills;Good   Affect/Mood Display Appropriate   Cognition Alert   Psychomotor WNL   Patient shared she lencho to relax, go out to dinner with friends to socialize, walk her dog for exercise, do crossword puzzles for mental stimulation, bowl to compete and paint and color to be creative.

## 2020-11-25 NOTE — PROGRESS NOTES
"PSYCHIATRY DAILY INPATIENT PROGRESS NOTE  SUBSEQUENT HOSPITAL VISIT    ENCOUNTER DATE: 11/25/2020  SITE: [facility]    DATE OF ADMISSION: 11/22/2020  4:50 PM  LENGTH OF STAY: 3 days      HISTORY    CHIEF COMPLAINT   Modesta Perez is a 39 y.o. female, seen during daily quiroz rounds on the inpatient unit.  Modesta Perez presents with the chief complaint of depression, SI    HPI:    The patient was seen and examined. The chart was reviewed.     Reviewed notes by CTRS, NP, MD,  from the last 24 hours.    The patient's case was discussed with the treatment team and care providers today.    Staff reports no behavioral or management issues.     The patient has been compliant with treatment. The patient denied any side effects.        Subjective 11/25/2020  "I'm calm, enjoying groups." "I was upset because I was attached to the puppy... I could have talked to a family member or gone for a walk."      Depressed mood - trend: none  Interest/pleasure/anhedonia: trend: none  Guilt/hopelessness/worthlessness - trend: none  Changes in Sleep - trend: decreasing steadily  Changes in Appetite - trend: none   Changes in Concentrations - trend: decreasing steadily  Changes in Energy - trend: decreasing steadily  Suicidal- active/passive ideations - trend: decreasing, no plan or intent currently  Homicidal ideations: active/passive ideations - trend: none    Hallucinations - trend: decreasing steadily  Paranoia - trend: none  Delusions - trend: none  Disorganized behavior/speech - trend: none  Negative symptoms - trend: none    Elevated mood - trend: decreasing steadily  Racing thoughts - trend: decreasing steadily  Impulsivity - trend: decreasing steadily  Agitation - trend: none  Irritability- trend: decreasing steadily    Symptoms of YUDITH - trend: improving  Symptoms of Panic Disorder- trend: none  Symptoms of PTSD - trend: decreasing steadily                ROS  Review of Systems   Constitutional: Negative for " chills and fever.   HENT: Negative for hearing loss.    Eyes: Negative for blurred vision and double vision.   Respiratory: Negative for shortness of breath.    Cardiovascular: Negative for chest pain and palpitations.   Gastrointestinal: Negative for nausea and vomiting.   Genitourinary: Negative for dysuria.   Musculoskeletal: Negative for back pain and joint pain.   Skin: Negative for rash.   Neurological: Negative for dizziness and headaches.   Endo/Heme/Allergies: Negative for environmental allergies.         PAST MEDICAL HISTORY   Past Medical History:   Diagnosis Date    Anxiety     Bipolar disorder     Depression     History of psychiatric hospitalization     Hx of psychiatric care     Idania     Psychiatric problem     Psychosis     PTSD (post-traumatic stress disorder)     Schizoaffective disorder     Suicide attempt     Therapy            PSYCHOTROPIC MEDICATIONS   Scheduled Meds:   buPROPion  300 mg Oral QAM    busPIRone  30 mg Oral BID    cholecalciferol (vitamin D3)  50,000 Units Oral Q7 Days    clonazePAM  0.5 mg Oral BID    folic acid  1 mg Oral Daily    lamoTRIgine  100 mg Oral Daily    lamoTRIgine  200 mg Oral QHS    lurasidone  100 mg Oral Daily with dinner    multivitamin  1 tablet Oral Daily    zolpidem  5 mg Oral QHS     Continuous Infusions:  PRN Meds:.acetaminophen, aluminum-magnesium hydroxide-simethicone, docusate sodium, hydrOXYzine pamoate, loperamide, OLANZapine **AND** OLANZapine        EXAMINATION    VITALS   Vitals:    11/25/20 0800   BP: 111/75   Pulse: 83   Resp: 18   Temp: 98.3 °F (36.8 °C)         CONSTITUTIONAL  General Appearance: unremarkable, age appropriate    MUSCULOSKELETAL  Muscle Strength and Tone:no tremor, no tic  Abnormal Involuntary Movements: No  Gait and Station: non-ataxic    PSYCHIATRIC   Level of Consciousness: awake and alert   Orientation: person, place and situation  Grooming: Casually dressed and Well groomed  Psychomotor Behavior:  "normal, cooperative  Speech: normal tone, normal rate, normal pitch, normal volume  Language: grossly intact  Mood: "calm"  Affect: Consistent with mood  Thought Process: linear, logical  Associations: intact   Thought Content: no delusions  Perceptions: less AH  Memory: Able to recall past events, Remote intact and Recent intact  Attention:Attends to interview without distraction  Fund of Knowledge: Aware of current events and Vocabulary appropriate   Estimate if Intelligence:  Average based on work/education history, vocabulary and mental status exam  Insight: has awareness of illness  Judgment: behavior is adequate to circumstances          DIAGNOSTIC TESTING   Laboratory Results  No results found for this or any previous visit (from the past 24 hour(s)).    ASSESSMENT      IMPRESSION   Bipolar I Disorder mre manic, severe with psychotic features  Insomnia secondary to a mental illness  YUDITH  Panic Attacks  PTSD     Psychosocial stressors     Vitamin D deficiency               MEDICAL DECISION MAKING          PROBLEM LIST AND MANAGEMENT PLANS; PRESCRIPTION DRUG MANAGEMENT  Compliance: yes  Side Effects: no  Regimen Adjustments:      Bipolar disorder: pt counseled  -resumed/continue Wellbutrin  mg po q day; pt counseled on risk for worsening yasemin but she would like to continue if able as the B>R, and her previous depression were severe; will change/adjustment as needed  - Resumed/continue Lamictal at 100mg po q Am and 200 mg po q HS- check level (pending)  - Resumed/continue Latuda at 80 mg po q day with dinner- increased to 100 mg po q day with dinner     Anxiety: pt counseled  -wellbutrin off-label as above  - resumed/continue buspar at 30 mg po BID  - resumed/continue klonopin at 0.5 mg po BID     Insomnia: pt counseled  - resumed/continue ambien at 5 mg po q HS     PTSD: pt counseled  -consider trial of prazosin     Psychosocial stressors: pt counseled  -SW consulted to assist with resources     Vitamin " D deficiency: pt counseled  - Started/continue Vitamin D3 50,000 units po q week x 8 weeks; 1/8 completed       PRESCRIPTION DRUG MANAGEMENT  Compliance: Yes  Side Effects: No  Regimen Adjustments: see above      Discussed diagnosis, risks and benefits of proposed treatment vs alternative treatments vs no treatment, potential side effects of these treatments and the inherent unpredictability of treatment. The patient expresses understanding of the above and displays the capacity to agree with this treatment given said understanding. Patient also agrees that, currently, the benefits outweigh the risks and would like to pursue/continue treatment at this time.      DISCHARGE PLANNING  Expected Disposition Plan: Home or Self Care      NEED FOR CONTINUED HOSPITALIZATION  Psychiatric illness continues to pose a potential threat to life or bodily function, of self or others, thereby requiring the need for continued inpatient psychiatric hospitalization: Yes as evidenced by : danger to self    Protective inpatient pyschiatric hospitalization required while a safe disposition plan is enacted: Yes    Patient stabilized and ready for discharge from inpatient psychiatric unit: No      STAFF:   Yosi Richardson III, MD  Psychiatry

## 2020-11-26 PROCEDURE — 90833 PSYTX W PT W E/M 30 MIN: CPT | Mod: AF,HB,, | Performed by: PSYCHIATRY & NEUROLOGY

## 2020-11-26 PROCEDURE — 99233 SBSQ HOSP IP/OBS HIGH 50: CPT | Mod: AF,HB,, | Performed by: PSYCHIATRY & NEUROLOGY

## 2020-11-26 PROCEDURE — 25000003 PHARM REV CODE 250: Performed by: PSYCHIATRY & NEUROLOGY

## 2020-11-26 PROCEDURE — 90833 PR PSYCHOTHERAPY W/PATIENT W/E&M, 30 MIN (ADD ON): ICD-10-PCS | Mod: AF,HB,, | Performed by: PSYCHIATRY & NEUROLOGY

## 2020-11-26 PROCEDURE — 99233 PR SUBSEQUENT HOSPITAL CARE,LEVL III: ICD-10-PCS | Mod: AF,HB,, | Performed by: PSYCHIATRY & NEUROLOGY

## 2020-11-26 PROCEDURE — 11400000 HC PSYCH PRIVATE ROOM

## 2020-11-26 RX ADMIN — ZOLPIDEM TARTRATE 5 MG: 5 TABLET ORAL at 08:11

## 2020-11-26 RX ADMIN — OLANZAPINE 10 MG: 10 TABLET, FILM COATED ORAL at 03:11

## 2020-11-26 RX ADMIN — BUPROPION HYDROCHLORIDE 300 MG: 300 TABLET, FILM COATED, EXTENDED RELEASE ORAL at 06:11

## 2020-11-26 RX ADMIN — LAMOTRIGINE 100 MG: 100 TABLET ORAL at 08:11

## 2020-11-26 RX ADMIN — LURASIDONE HYDROCHLORIDE 100 MG: 40 TABLET, FILM COATED ORAL at 04:11

## 2020-11-26 RX ADMIN — CLONAZEPAM 0.5 MG: 0.5 TABLET ORAL at 08:11

## 2020-11-26 RX ADMIN — THERA TABS 1 TABLET: TAB at 08:11

## 2020-11-26 RX ADMIN — LAMOTRIGINE 200 MG: 100 TABLET ORAL at 08:11

## 2020-11-26 RX ADMIN — BUSPIRONE HYDROCHLORIDE 30 MG: 10 TABLET ORAL at 08:11

## 2020-11-26 RX ADMIN — ACETAMINOPHEN 650 MG: 325 TABLET ORAL at 03:11

## 2020-11-26 RX ADMIN — FOLIC ACID 1 MG: 1 TABLET ORAL at 08:11

## 2020-11-26 NOTE — PLAN OF CARE
Plan of care reviewed. Pt walking in hallway, talking on phone. Pt states she had a good day and is ready to be discharged. States she was feeling depressed after not being able to get a puppy. States she had been communicating with a breeder for about a month but her boyfriend did not think she was in a good enough place in her life to take on the responsibilities of a puppy. Pt became depressed and had suicidal thoughts after argument. Denies intent to harm self or others at this time. Denies AH/VH. Rates depression 0/10 and anxiety 0/10. Accepts snacks offered. Takes medications as prescribed. Gait steady, no falls. Interacting with staff and peers. Promoted an individualized safety plan, reality-based interactions, effective coping strategies, and impulse control.  Will continue to monitor for safety.

## 2020-11-26 NOTE — NURSING
Pt tearful and complaining of headache 7/10. Rates anxiety 10/10. Pt agitated and requesting medication.  mg acetaminophen PO and 10 mg olanzapine PO administered. Pt tolerated well. Will continue to monitor pt closely.

## 2020-11-26 NOTE — PROGRESS NOTES
PSYCHIATRY DAILY INPATIENT PROGRESS NOTE  SUBSEQUENT HOSPITAL VISIT    ENCOUNTER DATE: 11/26/2020  SITE: [facility]    DATE OF ADMISSION: 11/22/2020  4:50 PM  LENGTH OF STAY: 4 days      HISTORY    CHIEF COMPLAINT   Modesta Perez is a 39 y.o. female, seen during daily quiroz rounds on the inpatient unit.  Modesta Perez presents with the chief complaint of depression, SI    HPI:    The patient was seen and examined. The chart was reviewed.     Reviewed notes by CTRS, LMSW, RNs and MD  from the last 24 hours.    The patient's case was discussed with the treatment team and care providers today, including RNs.    Staff reports no behavioral or management issues.     The patient has been compliant with treatment. The patient denied any side effects.    Subjective 11/26/2020    The patient notes globally improving symptoms as documented below. She is responding well with her medication adjustments. Her thought process is improving with resolving symptoms of yasemin/psychosis   She is approaching her baseline but symptoms remain fx/bx impairing at this time- she will benefit form continued admission and allowing medication adjustments to full take effect.     She had a headache this AM which resolved with prns.     Depressed mood - trend: none  Interest/pleasure/anhedonia: trend: none  Guilt/hopelessness/worthlessness - trend: none  Changes in Sleep - trend: decreasing/improving steadily  Changes in Appetite - trend: none   Changes in Concentrations - trend: decreasing steadily/improving  Changes in Energy - trend: decreasing steadily/improving    Suicidal- active/passive ideations - trend: /improving, no plan or intent currently  Homicidal ideations: active/passive ideations - trend: none    Hallucinations - trend: decreasing/improving steadily  Paranoia - trend: none  Delusions - trend: none  Disorganized behavior/speech - trend: none  Negative symptoms - trend: none    Elevated mood - trend:  decreasing/improving steadily  Racing thoughts - trend: decreasing/improving steadily  Impulsivity - trend: decreasing/improivng steadily  Agitation - trend: none  Irritability- trend: decreasing/improving steadily    Symptoms of YUDITH - trend: improving  Symptoms of Panic Disorder- trend: none  Symptoms of PTSD - trend: improving        PSYCHOTHERAPY ADD-ON +09061   30 (16-37*) minutes     Time: 16 minutes  Participants: Met with patient     Therapeutic Intervention Type: behavior modifying psychotherapy, supportive psychotherapy  Why chosen therapy is appropriate versus another modality: relevant to diagnosis, patient responds to this modality, evidence based practice     Target symptoms: mood disorder, psychosis  Primary focus: mood disorder, psychosocial stressors  Psychotherapeutic techniques: supportive techniques, psycho-education; managing stressors and precipitants; preventing symptoms recurrence     Outcome monitoring methods: self-report, observation     Patient's response to intervention:  The patient's response to intervention is accepting.     Progress toward goals:  The patient's progress toward goals is good .        ROS  Review of Systems   Constitutional: Negative for chills and fever.   HENT: Negative for hearing loss.    Eyes: Negative for blurred vision and double vision.   Respiratory: Negative for shortness of breath.    Cardiovascular: Negative for chest pain and palpitations.   Gastrointestinal: Negative for nausea and vomiting.   Genitourinary: Negative for dysuria.   Musculoskeletal: Negative for back pain and joint pain.   Skin: Negative for rash.   Neurological: Negative for dizziness and headaches.   Endo/Heme/Allergies: Negative for environmental allergies.         PAST MEDICAL HISTORY   Past Medical History:   Diagnosis Date    Anxiety     Bipolar disorder     Depression     History of psychiatric hospitalization     Hx of psychiatric care     Idania     Psychiatric problem      "Psychosis     PTSD (post-traumatic stress disorder)     Schizoaffective disorder     Suicide attempt     Therapy            PSYCHOTROPIC MEDICATIONS   Scheduled Meds:   buPROPion  300 mg Oral QAM    busPIRone  30 mg Oral BID    cholecalciferol (vitamin D3)  50,000 Units Oral Q7 Days    clonazePAM  0.5 mg Oral BID    folic acid  1 mg Oral Daily    lamoTRIgine  100 mg Oral Daily    lamoTRIgine  200 mg Oral QHS    lurasidone  100 mg Oral Daily with dinner    multivitamin  1 tablet Oral Daily    zolpidem  5 mg Oral QHS     Continuous Infusions:  PRN Meds:.acetaminophen, aluminum-magnesium hydroxide-simethicone, docusate sodium, hydrOXYzine pamoate, loperamide, OLANZapine **AND** OLANZapine        EXAMINATION    VITALS   Vitals:    11/26/20 0732   BP: 122/70   Pulse: 84   Resp: 16   Temp: 98 °F (36.7 °C)     Body mass index is 22.6 kg/m².      CONSTITUTIONAL  General Appearance: unremarkable, age appropriate, normal weight, well nourished, casually dressed, neatly groomed    MUSCULOSKELETAL  Muscle Strength and Tone:no tremor, no tic; normal  Abnormal Involuntary Movements: None  Gait and Station: non-ataxic    PSYCHIATRIC   Level of Consciousness: awake and alert   Orientation: person, place, time and situation  Grooming: Casually dressed and Well groomed  Psychomotor Behavior: normal, cooperative, eye contact normal, no PMA/R  Speech: normal tone, normal rate, normal pitch, normal volume  Language: grossly intact, able to name, able to repeat  Mood: "ok"  Affect: Consistent with mood; improving  Thought Process: linear, logical, goal directed,   Associations: intact, no URMILA   Thought Content: DENIES suicidal ideation, DENIES homicidal ideation and no delusions  Perceptions: denies hallucinations  Memory: Able to recall past events, Remote intact and Recent intact  Attention: Attends to interview without distraction  Fund of Knowledge: Aware of current events and Vocabulary appropriate   Estimate if " Intelligence:  Average based on work/education history, vocabulary and mental status exam  Insight: good- has awareness of illness  Judgment: good- behavior is adequate to circumstances          DIAGNOSTIC TESTING   Laboratory Results  No results found for this or any previous visit (from the past 24 hour(s)).    ASSESSMENT      IMPRESSION   Bipolar I Disorder mre manic, severe with psychotic features  Insomnia secondary to a mental illness  YUDITH  Panic Attacks  PTSD     Psychosocial stressors     Vitamin D deficiency               MEDICAL DECISION MAKING          PROBLEM LIST AND MANAGEMENT PLANS; PRESCRIPTION DRUG MANAGEMENT  Compliance: yes  Side Effects: no  Regimen Adjustments:      Bipolar disorder: pt counseled  -resumed/continue Wellbutrin  mg po q day; pt counseled on risk for worsening yasemin but she would like to continue if able as the B>R, and her previous depression were severe; will change/adjustment as needed  - Resumed/continue Lamictal at 100mg po q Am and 200 mg po q HS- level WNL  - Resumed/continue Latuda at 80 mg po q day with dinner- increased to/continue 100 mg po q day with dinner; will further adjust if needed     Anxiety: pt counseled  -wellbutrin off-label as above  - resumed/continue buspar at 30 mg po BID  - resumed/continue klonopin at 0.5 mg po BID     Insomnia: pt counseled  - resumed/continue ambien at 5 mg po q HS     PTSD: pt counseled  -consider trial of prazosin     Psychosocial stressors: pt counseled  -SW consulted to assist with resources     Vitamin D deficiency: pt counseled  - Started/continue Vitamin D3 50,000 units po q week x 8 weeks; 1/8 completed       PRESCRIPTION DRUG MANAGEMENT  Compliance: Yes  Side Effects: No  Regimen Adjustments: see above      Discussed diagnosis, risks and benefits of proposed treatment vs alternative treatments vs no treatment, potential side effects of these treatments and the inherent unpredictability of treatment. The patient  expresses understanding of the above and displays the capacity to agree with this treatment given said understanding. Patient also agrees that, currently, the benefits outweigh the risks and would like to pursue/continue treatment at this time.      DISCHARGE PLANNING  Expected Disposition Plan: Home or Self Care      NEED FOR CONTINUED HOSPITALIZATION  Psychiatric illness continues to pose a potential threat to life or bodily function, of self or others, thereby requiring the need for continued inpatient psychiatric hospitalization: Yes as evidenced by : danger to self    Protective inpatient pyschiatric hospitalization required while a safe disposition plan is enacted: Yes    Patient stabilized and ready for discharge from inpatient psychiatric unit: No      STAFF:   Sundar Cantu MD  Psychiatry

## 2020-11-26 NOTE — PLAN OF CARE
Pt is calm and cooperative.  Slightly guarded but interacts appropriately with others when she is on unit.  Still prefers to be by herself most of the time.  Pt denies any S/I or H/I at this time.  Also denies hearing any voices at this time.  Affect still a little flat but states her mood is better and she is ready to be discharged.  No acute distress apparent at this time, will continue to monitor.

## 2020-11-26 NOTE — PLAN OF CARE
Pt lying in bed, awake. Talking with roommate. NAFISA. Denies needs at this time. Slept 6.5 hours thus far with 1 interruption. Safety and precautions maintained with rounds every 15 minutes. Bed is fixed in a low position and pathways kept clear. No falls occurred. Will continue to monitor closely for safety.

## 2020-11-27 VITALS
TEMPERATURE: 98 F | WEIGHT: 135.81 LBS | BODY MASS INDEX: 22.63 KG/M2 | RESPIRATION RATE: 18 BRPM | HEIGHT: 65 IN | SYSTOLIC BLOOD PRESSURE: 124 MMHG | HEART RATE: 76 BPM | DIASTOLIC BLOOD PRESSURE: 79 MMHG | OXYGEN SATURATION: 100 %

## 2020-11-27 PROCEDURE — 99239 PR HOSPITAL DISCHARGE DAY,>30 MIN: ICD-10-PCS | Mod: AF,HB,S$PBB, | Performed by: STUDENT IN AN ORGANIZED HEALTH CARE EDUCATION/TRAINING PROGRAM

## 2020-11-27 PROCEDURE — 99239 HOSP IP/OBS DSCHRG MGMT >30: CPT | Mod: AF,HB,S$PBB, | Performed by: STUDENT IN AN ORGANIZED HEALTH CARE EDUCATION/TRAINING PROGRAM

## 2020-11-27 PROCEDURE — 25000003 PHARM REV CODE 250: Performed by: PSYCHIATRY & NEUROLOGY

## 2020-11-27 RX ORDER — LURASIDONE HYDROCHLORIDE 20 MG/1
100 TABLET, FILM COATED ORAL
Qty: 150 TABLET | Refills: 0 | Status: SHIPPED | OUTPATIENT
Start: 2020-11-27 | End: 2021-11-27

## 2020-11-27 RX ORDER — LAMOTRIGINE 200 MG/1
200 TABLET ORAL NIGHTLY
Qty: 30 TABLET | Refills: 0 | Status: SHIPPED | OUTPATIENT
Start: 2020-11-27

## 2020-11-27 RX ORDER — BUPROPION HYDROCHLORIDE 300 MG/1
300 TABLET ORAL DAILY
Qty: 30 TABLET | Refills: 0 | Status: SHIPPED | OUTPATIENT
Start: 2020-11-27 | End: 2021-11-27

## 2020-11-27 RX ORDER — LAMOTRIGINE 100 MG/1
100 TABLET ORAL DAILY
Qty: 30 TABLET | Refills: 0 | Status: SHIPPED | OUTPATIENT
Start: 2020-11-27

## 2020-11-27 RX ORDER — BUSPIRONE HYDROCHLORIDE 30 MG/1
30 TABLET ORAL 2 TIMES DAILY
Qty: 60 TABLET | Refills: 0 | Status: SHIPPED | OUTPATIENT
Start: 2020-11-27 | End: 2021-11-27

## 2020-11-27 RX ADMIN — BUSPIRONE HYDROCHLORIDE 30 MG: 10 TABLET ORAL at 08:11

## 2020-11-27 RX ADMIN — FOLIC ACID 1 MG: 1 TABLET ORAL at 08:11

## 2020-11-27 RX ADMIN — ACETAMINOPHEN 650 MG: 325 TABLET ORAL at 03:11

## 2020-11-27 RX ADMIN — BUPROPION HYDROCHLORIDE 300 MG: 300 TABLET, FILM COATED, EXTENDED RELEASE ORAL at 06:11

## 2020-11-27 RX ADMIN — THERA TABS 1 TABLET: TAB at 08:11

## 2020-11-27 RX ADMIN — LAMOTRIGINE 100 MG: 100 TABLET ORAL at 08:11

## 2020-11-27 RX ADMIN — CLONAZEPAM 0.5 MG: 0.5 TABLET ORAL at 08:11

## 2020-11-27 NOTE — DISCHARGE SUMMARY
"Discharge Summary  Psychiatry    Admit Date: 11/22/2020    Discharge Date and Time:  11/27/2020 11:24 AM    Attending Physician: Sundar Cantu MD     Discharge Provider: Yosi Richardson III    Reason for Admission:  Modesta Perez is a 39 y.o. female with a past psychiatric history of bipolar I disorder and anxiety, currently admitted to the inpatient unit with the following chief complaint: mood/psychotic disturbance, "I was going to get a puppy.. but then I thought it wasn't a good idea.. yesterday was the day I was supposed to get the puppy.. I have Bipolar one not that that's a crunch..."    HPI   (Elements: Location, Quality, Severity, Duration, Timing, Content, Modifying Factors, Associated Signs & Symptoms)     The patient was seen and examined. The chart was reviewed.     The patient presented to the ER on 11/22/20 with complaints of psychosis/mood symptoms. Per the Er and staff notes:  -Patient presents to the ER with the need for PEC.  Patient is having AH which are telling her to hurt herself and others.  Patient is seen by Dr. Baugh.  Patient has psychiatric history.  Patient reports that the voices started this morning, but she has been going through some things which have been accumulating to her psychosis  -Modesta Perez presents to the emergency room for psychiatric evaluation  Patient with long history of bipolar disorder, also states she has PTSD chronically  Patient states voices are telling her to kill herself this morning, HX of hallucination  Patient states that she has heard voices before compliant with all medication now  Patient has severe anxiety and depression issues, sees local psychiatrist monthly  -Pt cooperative during admission assessment.  Denies any S/I or H/I at this time.  Does admit to hearing voices and states she is compliant with her medications but feels they need adjusting.  Slightly suspicious maybe slightly guarded as well.  Pt has a history of " "inpatient psych admits in the past with a history of bipolar disorder.  Pt sees Dr Baugh for her outpatient psychiatrist.   -Pt is calm, quiet, cooperative and guarded.  Little interaction with peers.  Endorses auditory hallucinations but denies any SI/HI.  -States "I'm only here because I couldn't get my puppy.",  poor insight/judgement     The patient was medically cleared and admitted to the [facility].      The patient reports that she started having mental health issues with Bipolar around 2008 with many hospitalizations between 3135-3384 (for yasemin). She reports that she had been doing well for about 10 years, adhering to tx and f/u care at [facility] until this last week.     She reports that she was getting very excited about getting a puppy then found out that this was not going to happen.. She became upset and this caused her presentation.      She was initially hyper-verbal and rapid with her speech/thoughts, but she was able to regulate herself and better participate as the interview progressed. She reported +AH within the last 24 hours, but she denied then this AM. She appears manic vs hypomanic. She reports that she is doing very well in her medications, overall, and she would like to continue them with as little change as possible.     Procedures Performed: * No surgery found *    Hospital Course (synopsis of major diagnoses, care, treatment, and services provided during the course of the hospital stay):   The patient was stabilized and discharged on the following medications:     Medication List      CHANGE how you take these medications    buPROPion 300 MG 24 hr tablet  Commonly known as: WELLBUTRIN XL  Take 1 tablet (300 mg total) by mouth once daily.  What changed:   · medication strength  · when to take this     busPIRone 30 MG Tab  Commonly known as: BUSPAR  Take 1 tablet (30 mg total) by mouth 2 (two) times daily.  What changed: when to take this     lurasidone 20 mg Tab tablet  Commonly known as: " LATUDA  Take 5 tablets (100 mg total) by mouth daily with dinner or evening meal.  What changed:   · medication strength  · how much to take  · when to take this        CONTINUE taking these medications    clonazePAM 0.5 MG tablet  Commonly known as: KLONOPIN     * lamoTRIgine 100 MG tablet  Commonly known as: LAMICTAL  Take 1 tablet (100 mg total) by mouth once daily.     * lamoTRIgine 200 MG tablet  Commonly known as: LAMICTAL  Take 1 tablet (200 mg total) by mouth every evening.     zolpidem 10 mg Tab  Commonly known as: AMBIEN         * This list has 2 medication(s) that are the same as other medications prescribed for you. Read the directions carefully, and ask your doctor or other care provider to review them with you.            STOP taking these medications    biotin 10,000 mcg Tbdl           Where to Get Your Medications      You can get these medications from any pharmacy    Bring a paper prescription for each of these medications  · buPROPion 300 MG 24 hr tablet  · busPIRone 30 MG Tab  · lamoTRIgine 100 MG tablet  · lamoTRIgine 200 MG tablet  · lurasidone 20 mg Tab tablet           The patient was compliant with treatment. The patient denied any side effects.     Bipolar disorder: pt counseled  -resumed/continue Wellbutrin  mg po q day; pt counseled on risk for worsening yasemin but she would like to continue if able as the B>R, and her previous depression were severe; will change/adjustment as needed  - Resumed/continue Lamictal at 100mg po q Am and 200 mg po q HS- level WNL  - Resumed/continue Latuda at 80 mg po q day with dinner- increased to/continue 100 mg po q day with dinner; will further adjust if needed     Anxiety: pt counseled  -wellbutrin off-label as above  - resumed/continue buspar at 30 mg po BID  - resumed/continue klonopin at 0.5 mg po BID     Insomnia: pt counseled  - resumed/continue ambien at 5 mg po q HS     PTSD: pt counseled  -consider trial of prazosin     Psychosocial  stressors: pt counseled  -SW consulted to assist with resources     Vitamin D deficiency: pt counseled  - Started/continue Vitamin D3 50,000 units po q week x 8 weeks; 1/8 completed    Discussed diagnosis, risks and benefits of proposed treatment vs alternative treatments vs no treatment, and potential side effects of these treatments.  The patient expresses understanding of the above and displays the capacity to agree with this treatment given said understanding.  Patient also agrees that, currently, the benefits outweigh the risks and would like to pursue treatment at this time.    MSE: stated age, casually dressed, well groomed.  No psychomotor agitation or retardation.  No abnormal involuntary movements.  Gait normal.  Speech normal, conversational.  Language fluent English. Mood fine.  Affect normal range, pleasant, euthymic.  Thought process linear.  Associations intact.  Denies suicidal or homicidal ideation.  Denies auditory hallucinations, paranoid ideation, ideas of reference.  Memory intact.  Attention intact.  Fund of knowledge intact.  Insight intact.  Judgment intact.  Alert and oriented to person, place, time.      Tobacco Usage:  Is patient a smoker? No  Does patient want prescription for Tobacco Cessation? No  Does patient want counseling for Tobacco Cessation? No    If patient would like to quit, then over the counter nicotine patch could be used. The patient could also follow up with his PCP or psychiatric provider for other alternatives.     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  YUDITH  Panic Attacks  PTSD     Psychosocial stressors     Vitamin D deficiency     Labs:  Admission on 11/22/2020   Component Date Value Ref Range Status    Cholesterol 11/22/2020 162  120 - 199 mg/dL Final    Triglycerides 11/22/2020 65  30 - 150 mg/dL Final    HDL 11/22/2020 64  40 - 75 mg/dL  Final    LDL Cholesterol 11/22/2020 85.0  63.0 - 159.0 mg/dL Final    HDL/Cholesterol Ratio 11/22/2020 39.5  20.0 - 50.0 % Final    Total Cholesterol/HDL Ratio 11/22/2020 2.5  2.0 - 5.0 Final    Non-HDL Cholesterol 11/22/2020 98  mg/dL Final    Hemoglobin A1C 11/22/2020 4.5  4.0 - 5.6 % Final    Estimated Avg Glucose 11/22/2020 82  68 - 131 mg/dL Final    Lamotrigine Lvl 11/22/2020 4.6  2.0 - 15.0 ug/mL Final   Admission on 11/22/2020, Discharged on 11/22/2020   Component Date Value Ref Range Status    TSH 11/22/2020 1.819  0.400 - 4.000 uIU/mL Final    Alcohol, Serum 11/22/2020 <10  <10 mg/dL Final    Vit D, 25-Hydroxy 11/22/2020 26* 30 - 96 ng/mL Final    Folate 11/22/2020 15.3  4.0 - 24.0 ng/mL Final    T3, Free 11/22/2020 2.3  2.3 - 4.2 pg/mL Final    Free T4 11/22/2020 0.93  0.71 - 1.51 ng/dL Final    Vitamin B-12 11/22/2020 862  210 - 950 pg/mL Final    Benzodiazepines 11/22/2020 Presumptive Positive   Final    Methadone metabolites 11/22/2020 Negative   Final    Cocaine (Metab.) 11/22/2020 Negative   Final    Opiate Scrn, Ur 11/22/2020 Negative   Final    Barbiturate Screen, Ur 11/22/2020 Negative   Final    Amphetamine Screen, Ur 11/22/2020 Negative   Final    THC 11/22/2020 Negative   Final    Phencyclidine 11/22/2020 Negative   Final    Creatinine, Urine 11/22/2020 157.7  15.0 - 325.0 mg/dL Final    Toxicology Information 11/22/2020 SEE COMMENT   Final    Preg Test, Ur 11/22/2020 Negative   Final    Specimen UA 11/22/2020 Urine, Clean Catch   Final    Color, UA 11/22/2020 Yellow  Yellow, Straw, Lucy Final    Appearance, UA 11/22/2020 Clear  Clear Final    pH, UA 11/22/2020 6.0  5.0 - 8.0 Final    Specific Gravity, UA 11/22/2020 >=1.030* 1.005 - 1.030 Final    Protein, UA 11/22/2020 Negative  Negative Final    Glucose, UA 11/22/2020 Negative  Negative Final    Ketones, UA 11/22/2020 Negative  Negative Final    Bilirubin (UA) 11/22/2020 Negative  Negative Final    Occult  Blood UA 11/22/2020 Trace* Negative Final    Nitrite, UA 11/22/2020 Negative  Negative Final    Urobilinogen, UA 11/22/2020 Negative  <2.0 EU/dL Final    Leukocytes, UA 11/22/2020 Negative  Negative Final    Salicylate Lvl 11/22/2020 <5.0* 15.0 - 30.0 mg/dL Final    Acetaminophen (Tylenol), Serum 11/22/2020 <3.0* 10.0 - 20.0 ug/mL Final    Sodium 11/22/2020 142  136 - 145 mmol/L Final    Potassium 11/22/2020 3.9  3.5 - 5.1 mmol/L Final    Chloride 11/22/2020 108  95 - 110 mmol/L Final    CO2 11/22/2020 25  23 - 29 mmol/L Final    Glucose 11/22/2020 90  70 - 110 mg/dL Final    BUN 11/22/2020 13  6 - 20 mg/dL Final    Creatinine 11/22/2020 1.0  0.5 - 1.4 mg/dL Final    Calcium 11/22/2020 9.0  8.7 - 10.5 mg/dL Final    Total Protein 11/22/2020 7.2  6.0 - 8.4 g/dL Final    Albumin 11/22/2020 4.5  3.5 - 5.2 g/dL Final    Total Bilirubin 11/22/2020 0.4  0.1 - 1.0 mg/dL Final    Alkaline Phosphatase 11/22/2020 47* 55 - 135 U/L Final    AST 11/22/2020 14  10 - 40 U/L Final    ALT 11/22/2020 21  10 - 44 U/L Final    Anion Gap 11/22/2020 9  8 - 16 mmol/L Final    eGFR if African American 11/22/2020 >60  >60 mL/min/1.73 m^2 Final    eGFR if non African American 11/22/2020 >60  >60 mL/min/1.73 m^2 Final    SARS-CoV-2 RNA, Amplification, Qual 11/22/2020 Negative  Negative Final    WBC 11/22/2020 9.01  3.90 - 12.70 K/uL Final    RBC 11/22/2020 4.12  4.00 - 5.40 M/uL Final    Hemoglobin 11/22/2020 12.7  12.0 - 16.0 g/dL Final    Hematocrit 11/22/2020 37.3  37.0 - 48.5 % Final    MCV 11/22/2020 91  82 - 98 fL Final    MCH 11/22/2020 30.8  27.0 - 31.0 pg Final    MCHC 11/22/2020 34.0  32.0 - 36.0 g/dL Final    RDW 11/22/2020 12.2  11.5 - 14.5 % Final    Platelets 11/22/2020 327  150 - 350 K/uL Final    MPV 11/22/2020 8.8* 9.2 - 12.9 fL Final    Immature Granulocytes 11/22/2020 0.3  0.0 - 0.5 % Final    Gran # (ANC) 11/22/2020 5.7  1.8 - 7.7 K/uL Final    Immature Grans (Abs) 11/22/2020 0.03  0.00  - 0.04 K/uL Final    Lymph # 11/22/2020 2.4  1.0 - 4.8 K/uL Final    Mono # 11/22/2020 0.6  0.3 - 1.0 K/uL Final    Eos # 11/22/2020 0.3  0.0 - 0.5 K/uL Final    Baso # 11/22/2020 0.04  0.00 - 0.20 K/uL Final    nRBC 11/22/2020 0  0 /100 WBC Final    Gran % 11/22/2020 62.7  38.0 - 73.0 % Final    Lymph % 11/22/2020 26.9  18.0 - 48.0 % Final    Mono % 11/22/2020 6.8  4.0 - 15.0 % Final    Eosinophil % 11/22/2020 2.9  0.0 - 8.0 % Final    Basophil % 11/22/2020 0.4  0.0 - 1.9 % Final    Differential Method 11/22/2020 Automated   Final         Discharged Condition: stable and improved; not currently a danger to self/others or gravely disabled    Disposition: Home or Self Care    Is patient being discharged on multiple neuroleptics? No    Follow Up/Patient Instructions:     Medications:  Reconciled Home Medications:      Medication List      CHANGE how you take these medications    buPROPion 300 MG 24 hr tablet  Commonly known as: WELLBUTRIN XL  Take 1 tablet (300 mg total) by mouth once daily.  What changed:   · medication strength  · when to take this     busPIRone 30 MG Tab  Commonly known as: BUSPAR  Take 1 tablet (30 mg total) by mouth 2 (two) times daily.  What changed: when to take this     lurasidone 20 mg Tab tablet  Commonly known as: LATUDA  Take 5 tablets (100 mg total) by mouth daily with dinner or evening meal.  What changed:   · medication strength  · how much to take  · when to take this        CONTINUE taking these medications    clonazePAM 0.5 MG tablet  Commonly known as: KLONOPIN  Take 0.5 mg by mouth 2 (two) times daily as needed for Anxiety.     * lamoTRIgine 100 MG tablet  Commonly known as: LAMICTAL  Take 1 tablet (100 mg total) by mouth once daily.     * lamoTRIgine 200 MG tablet  Commonly known as: LAMICTAL  Take 1 tablet (200 mg total) by mouth every evening.     zolpidem 10 mg Tab  Commonly known as: AMBIEN  Take 5 mg by mouth nightly as needed.         * This list has 2  medication(s) that are the same as other medications prescribed for you. Read the directions carefully, and ask your doctor or other care provider to review them with you.            STOP taking these medications    biotin 10,000 mcg Tbdl          Discharge Procedure Orders   Diet Adult Regular     Notify your health care provider if you experience any of the following:  temperature >100.4     Notify your health care provider if you experience any of the following:  persistent nausea and vomiting or diarrhea     Notify your health care provider if you experience any of the following:  persistent dizziness, light-headedness, or visual disturbances     Notify your health care provider if you experience any of the following:  increased confusion or weakness     Notify your health care provider if you experience any of the following:   Order Comments: Suicidal thoughts, homicidal thoughts, or any other changes in mental status  If you would like immediate help/crisis counseling, please call [phone] (TALK). Through this toll-free phone number for a network of crisis centers across the country. These centers staff their lines with people who are trained to listen and offer support to people in emotional crisis. If you are in an emergency, please call 211.     Activity as tolerated           Diet: regular     Activity as tolerated    Total time spent discharging patient: 32 minutes    Yosi Richardson III, MD  Psychiatry

## 2020-11-27 NOTE — PROGRESS NOTES
"   11/27/20 1045   [facility] Group Therapy   Group Name Therapeutic Recreation   Specific Interventions Cognitive Stimulation Training   Participation Level Appropriate;Attentive;Sharing   Participation Quality Cooperative   Insight/Motivation Good   Affect/Mood Display Appropriate   Cognition Alert   Psychomotor WNL   Patient reports "good" mood and reports feeling "confident" about new coping skills because of the education and reading material given to her by staff.  "

## 2020-11-27 NOTE — PLAN OF CARE
LMSW faxed the patient's paperwork to [facility] at [phone], to get an outpatient counseling appointment.

## 2020-11-27 NOTE — PLAN OF CARE
Behavior:  Patient attended psychotherapy group today. She was dressed in her own clothes and wearing a mask.    Intervention:  The story There's a Hole in My Sidewalk: The Romance of Self-Discovery by Liza Quick, was used to discuss habits, denial, and solutions to change in psychotherapy group this date.     Response:  The patient stated that her hole in the sidewalk is self-harm. She will work on using better coping skills.    Plan:   To continue to encourage patient to attend group psychotherapy and to learn more about ways that they may identify solutions to problems to help them change a behavior.

## 2020-11-27 NOTE — PLAN OF CARE
"Plan of care reviewed. Pt in room, performing ADL's. States her day was "good" and is ready to be discharged. States she had a panic attack early this morning due to waking up and not remembering where she was. States she had been "feeling down" today due to spending Thanksgiving on the unit but is currently feeling better. Denies intent to harm self or others at this time. Denies AH/VH. Rates depression 0/10 and anxiety 1/10. Accepts snacks offered. Takes medications as prescribed. Gait steady, no falls. Interacting with staff and peers. Promoted an individualized safety plan, reality-based interactions, effective coping strategies, and impulse control.  Will continue to monitor for safety.        "

## 2020-11-27 NOTE — NURSING
Pt awake during round. Complaining of right hip pain 6/10.  mg acetaminophen PO administered. Pt tolerated well. Will continue to monitor closely.

## 2020-11-27 NOTE — NURSING
Pt discharge arranged for today.  All belongings accounted for and will be returned upon discharge.  DC instructions and meds reviewed with pt, understanding verbalized.  Pt denies any S/I or H/i.  Pt will be following up with [facility] at [address] [place] [address] , Rashmi Syed [id number].  Phone number [phone].  Pt will be called Monday 11/30/20 with appointment date and time d/t offices being closed today.  Pt does not smoke or use drugs.  AVS faxed 11/27/20 at [phone].  Pt has a ride coming to get her.

## 2020-11-27 NOTE — PLAN OF CARE
Pt lying quietly in bed with eyes closed. Appears asleep. Respirations even and unlabored. NADN. Slept 4.5 hours thus far with 1 interruption. Safety and precautions maintained with rounds every 15 minutes. Bed is fixed in a low position and pathways kept clear. No falls occurred. Will continue to monitor closely for safety.

## 2020-11-30 NOTE — CARE UPDATE
Modesta Perez MRN:[id number] ([facility]#[id number]) (39 y.o. F) (Adm: 20)  [facility] [id number]  Patient Demographics    Patient Name   Modesta Perez Sex   Female          Age   1980 (39 y.o.) SSN   [id number] Address   [address] [place]   [address]   [place] [address] Phone   [phone] (Home)   [phone] (Mobile) *Preferred*   Patient Ethnicity & Race    Ethnic Group Patient Race   /White White   Patient Demographics    Address   [address]   [address]   [place] [address] Phone   [phone] (Home)   [phone] (Mobile) *Preferred* E-mail Address   [email or web address]   PCP and Center    Primary Care Provider Center   Primary Doctor No None   Emergency Contact(s)    Name Relation Home Work Mobile   Chong Douglas Significant other   [phone]   legendre, marisol Relative   [phone]   Documents on File     Status Date Received Description   Documents for the Patient   Patient ID Received 20 [place] DL 2021   Insurance Documents Received 20 MEDICAID-[facility]   Notice of Privacy Pract Maximus JUAN      Involvement In Care      Notice of Privacy Pract Ackn Signed 20 HIPAA/SELF   Clinic Authorization Signed 20 CONSENT/SELF   Consents      Advance Beneficiary Notice Not Received     Notice of Privacy Practice [facility]      Advance Directive Acknowledgement      Provider Based Acknowledgement      Plain Language Summary English Acknowledged () 20    Insurance Verification Fax Received 20 [facility] verification fax   Advance Directive Acknowledgement Received 20 pdsa -- self   [facility] Contracted Facility Disclosure Signed 20 o[facility] -- self   Patient Photo   Photo of Patient   [facility] Contracted Facility Disclosure Unable to Obtain (Deleted) 20    Documents for the Encounter   Medicare Lifetime Reserve Form      Important Medicare Message Printed at Discharge      Advance Beneficiary Notice      Hospital Authorization      Hospital  Authorization Scanned Signed 11/23/20 consent - self   Flowsheets Nursing Received 11/23/20    Patient Instructions Attachment   Schizophrenia, Treating (English)   Patient Instructions Attachment   Schizophrenia, Understanding (English)   After Visit Summary   IP PSYCH AVS   Patient Instructions Attachment  (Deleted)  Schizophrenia, General (English)   Admission Information    Current Information    Attending Provider Admitting Provider Admission Type Admission Status    Sundar Cantu MD Urgent Discharged (Confirmed)          Admission Date/Time Discharge Date Hospital Service Auth/Cert Status   11/22/20  04:50 PM 11/27/20 Psychiatry Incomplete          Hospital Area Unit Room/Bed    [facility] 210/210 D            Discharge Disposition Discharge Destination   Home or Self Care    Admission    Complaint      Hospital Account    Name Acct ID Class Status Primary Coverage   Modesta Perez [id number] IP- Psych Discharged/Not Billed MEDICAID - [facility] ([place]          Guarantor Account (for Hospital Account #[id number])    Name Relation to Pt Service Area Active? Acct Type   Modesta Perez Self [facility] Yes Behavioral Health   Address Phone     [address]   [address]   [place] [address] [phone](H)            Coverage Information (for Hospital Account #[id number])    F/O Payor/Plan Precert #   MEDICAID/[facility] ([place] MEDICAID)    Subscriber Subscriber #   Modesta Perez [id number]   Address Phone   [address] [address]   [place] [address] [phone]

## 2021-05-06 ENCOUNTER — PATIENT MESSAGE (OUTPATIENT)
Dept: RESEARCH | Facility: HOSPITAL | Age: 41
End: 2021-05-06

## 2022-06-27 ENCOUNTER — OFFICE VISIT (OUTPATIENT)
Dept: URGENT CARE | Facility: CLINIC | Age: 42
End: 2022-06-27
Payer: MEDICAID

## 2022-06-27 VITALS
WEIGHT: 135 LBS | TEMPERATURE: 97 F | BODY MASS INDEX: 22.49 KG/M2 | DIASTOLIC BLOOD PRESSURE: 74 MMHG | RESPIRATION RATE: 17 BRPM | HEIGHT: 65 IN | HEART RATE: 88 BPM | OXYGEN SATURATION: 99 % | SYSTOLIC BLOOD PRESSURE: 121 MMHG

## 2022-06-27 DIAGNOSIS — Z20.822 CONTACT WITH AND (SUSPECTED) EXPOSURE TO COVID-19: ICD-10-CM

## 2022-06-27 DIAGNOSIS — Z20.822 ENCOUNTER FOR LABORATORY TESTING FOR COVID-19 VIRUS: Primary | ICD-10-CM

## 2022-06-27 LAB
CTP QC/QA: YES
SARS-COV-2 RDRP RESP QL NAA+PROBE: NEGATIVE

## 2022-06-27 PROCEDURE — 3074F PR MOST RECENT SYSTOLIC BLOOD PRESSURE < 130 MM HG: ICD-10-PCS | Mod: CPTII,S$GLB,, | Performed by: PHYSICIAN ASSISTANT

## 2022-06-27 PROCEDURE — 99214 OFFICE O/P EST MOD 30 MIN: CPT | Mod: S$GLB,,, | Performed by: PHYSICIAN ASSISTANT

## 2022-06-27 PROCEDURE — 1160F PR REVIEW ALL MEDS BY PRESCRIBER/CLIN PHARMACIST DOCUMENTED: ICD-10-PCS | Mod: CPTII,S$GLB,, | Performed by: PHYSICIAN ASSISTANT

## 2022-06-27 PROCEDURE — U0002 COVID-19 LAB TEST NON-CDC: HCPCS | Mod: QW,S$GLB,, | Performed by: PHYSICIAN ASSISTANT

## 2022-06-27 PROCEDURE — 1159F MED LIST DOCD IN RCRD: CPT | Mod: CPTII,S$GLB,, | Performed by: PHYSICIAN ASSISTANT

## 2022-06-27 PROCEDURE — 3078F PR MOST RECENT DIASTOLIC BLOOD PRESSURE < 80 MM HG: ICD-10-PCS | Mod: CPTII,S$GLB,, | Performed by: PHYSICIAN ASSISTANT

## 2022-06-27 PROCEDURE — 3008F PR BODY MASS INDEX (BMI) DOCUMENTED: ICD-10-PCS | Mod: CPTII,S$GLB,, | Performed by: PHYSICIAN ASSISTANT

## 2022-06-27 PROCEDURE — 1159F PR MEDICATION LIST DOCUMENTED IN MEDICAL RECORD: ICD-10-PCS | Mod: CPTII,S$GLB,, | Performed by: PHYSICIAN ASSISTANT

## 2022-06-27 PROCEDURE — U0002: ICD-10-PCS | Mod: QW,S$GLB,, | Performed by: PHYSICIAN ASSISTANT

## 2022-06-27 PROCEDURE — 1160F RVW MEDS BY RX/DR IN RCRD: CPT | Mod: CPTII,S$GLB,, | Performed by: PHYSICIAN ASSISTANT

## 2022-06-27 PROCEDURE — 3008F BODY MASS INDEX DOCD: CPT | Mod: CPTII,S$GLB,, | Performed by: PHYSICIAN ASSISTANT

## 2022-06-27 PROCEDURE — 3078F DIAST BP <80 MM HG: CPT | Mod: CPTII,S$GLB,, | Performed by: PHYSICIAN ASSISTANT

## 2022-06-27 PROCEDURE — 99214 PR OFFICE/OUTPT VISIT, EST, LEVL IV, 30-39 MIN: ICD-10-PCS | Mod: S$GLB,,, | Performed by: PHYSICIAN ASSISTANT

## 2022-06-27 PROCEDURE — 3074F SYST BP LT 130 MM HG: CPT | Mod: CPTII,S$GLB,, | Performed by: PHYSICIAN ASSISTANT

## 2022-06-27 RX ORDER — GUAIFENESIN 600 MG/1
1200 TABLET, EXTENDED RELEASE ORAL 2 TIMES DAILY
Qty: 40 TABLET | Refills: 0 | Status: SHIPPED | OUTPATIENT
Start: 2022-06-27 | End: 2022-07-07

## 2022-06-27 RX ORDER — FLUTICASONE PROPIONATE 50 MCG
1 SPRAY, SUSPENSION (ML) NASAL 2 TIMES DAILY PRN
Qty: 15 G | Refills: 0 | Status: SHIPPED | OUTPATIENT
Start: 2022-06-27

## 2022-06-27 RX ORDER — PROMETHAZINE HYDROCHLORIDE AND DEXTROMETHORPHAN HYDROBROMIDE 6.25; 15 MG/5ML; MG/5ML
5 SYRUP ORAL EVERY 4 HOURS PRN
Qty: 180 ML | Refills: 0 | Status: SHIPPED | OUTPATIENT
Start: 2022-06-27 | End: 2022-07-07

## 2022-06-27 RX ORDER — IPRATROPIUM BROMIDE 42 UG/1
2 SPRAY, METERED NASAL 2 TIMES DAILY
Qty: 15 ML | Refills: 0 | Status: SHIPPED | OUTPATIENT
Start: 2022-06-27

## 2022-06-27 NOTE — LETTER
[address] [facility] [address], [facility] [[email or web address] A ? [place], [address] ? Phone [phone] ? Fax [phone]           Return to Work/School    Patient: Modesta Perez  YOB: 1980   Date: 06/27/2022      To Whom It May Concern:     Modesta Perez was in contact with/seen in my office on 06/27/2022. COVID-19 is present in our communities across the [facility]. Not all patients are eligible or appropriate to be tested. In this situation, your employee meets the following criteria:     Modesta Perez has met the criteria for COVID-19 testing and has a NEGATIVE result. The employee can return to work once they are asymptomatic for 24 hours without the use of fever reducing medications (Tylenol, Motrin, etc). Recommend quarantine until 07/02/22 due to exposure and symptoms.     If you have any questions or concerns, or if I can be of further assistance, please do not hesitate to contact me.     Sincerely,    Pamela Pereira PA-C

## 2022-06-27 NOTE — PROGRESS NOTES
"Subjective:       Patient ID: Modesta Perez is a 41 y.o. female.    Vitals:  height is 5' 5" (1.651 m) and weight is 61.2 kg (135 lb). Her temperature is 96.9 °F (36.1 °C). Her blood pressure is 121/74 and her pulse is 88. Her respiration is 17 and oxygen saturation is 99%.     Chief Complaint: Sinus Problem    Reports covid exposure last 7 days, symptoms started this morning. Not currently vaccinated, no previous covid exposure    Sinus Problem  The current episode started in the past 7 days. There has been no fever. Her pain is at a severity of 6/10. Associated symptoms include headaches, sinus pressure and a sore throat. (Body aches  Post nasal drip  Fatigue  Close exposure to COVID) Past treatments include nothing.       HENT: Positive for sinus pressure and sore throat.    Neurological: Positive for headaches.       Objective:      Physical Exam   Constitutional: She is oriented to person, place, and time. She appears well-developed. She is cooperative.  Non-toxic appearance. She does not appear ill. No distress.   HENT:   Head: Normocephalic and atraumatic.   Ears:   Right Ear: Hearing, tympanic membrane, external ear and ear canal normal. impacted cerumen  Left Ear: Hearing, tympanic membrane, external ear and ear canal normal. impacted cerumen  Nose: Rhinorrhea present. No congestion.   Mouth/Throat: Mucous membranes are moist. Posterior oropharyngeal erythema present. No oropharyngeal exudate. Oropharynx is clear.   Eyes: Conjunctivae and lids are normal. No scleral icterus.   Neck: Phonation normal. Neck supple.   Cardiovascular: Normal rate, regular rhythm and normal heart sounds.   No murmur heard.Exam reveals no gallop and no friction rub.   Pulmonary/Chest: Effort normal and breath sounds normal. No stridor. No respiratory distress. She has no wheezes. She has no rhonchi. She has no rales.   Abdominal: Normal appearance.   Neurological: She is alert and oriented to person, place, and time. " Coordination normal.   Skin: Skin is intact, not diaphoretic and not pale.   Psychiatric: Her speech is normal and behavior is normal. Judgment and thought content normal.   Nursing note and vitals reviewed.        Assessment:       1. Encounter for laboratory testing for COVID-19 virus    2. Contact with and (suspected) exposure to covid-19          Plan:         Encounter for laboratory testing for COVID-19 virus  -     POCT COVID-19 Rapid Screening    Contact with and (suspected) exposure to covid-19  -     fluticasone propionate (FLONASE) 50 mcg/actuation nasal spray; 1 spray (50 mcg total) by Each Nostril route 2 (two) times daily as needed.  Dispense: 15 g; Refill: 0  -     guaiFENesin (MUCINEX) 600 mg 12 hr tablet; Take 2 tablets (1,200 mg total) by mouth 2 (two) times daily. for 10 days  Dispense: 40 tablet; Refill: 0  -     promethazine-dextromethorphan (PROMETHAZINE-DM) 6.25-15 mg/5 mL Syrp; Take 5 mLs by mouth every 4 (four) hours as needed (cough).  Dispense: 180 mL; Refill: 0  -     ipratropium (ATROVENT) 42 mcg (0.06 %) nasal spray; 2 sprays by Nasal route 2 (two) times daily.  Dispense: 15 mL; Refill: 0      Results for orders placed or performed in visit on 06/27/22   POCT COVID-19 Rapid Screening   Result Value Ref Range    POC Rapid COVID Negative Negative     Acceptable Yes      Rest, increase po fluids and alternate ibuprofen and tylenol prn fever/body aches. Discussed with patient the importance of f/u with their primary care provider. Urged to go to the ER for any worsening signs or symptoms.

## 2022-06-27 NOTE — PATIENT INSTRUCTIONS
You must understand that you have received treatment at an Urgent Care facility only, and that you may be  released before all of your medical problems are known or treated. Urgent Care facilities are not equipped to  handle life threatening emergencies. It is recommended that you seek care at an Emergency Department for  further evaluation of worsening or concerning symptoms, or possibly life threatening conditions as  discussed.     No

## 2023-03-30 ENCOUNTER — OFFICE VISIT (OUTPATIENT)
Dept: URGENT CARE | Facility: CLINIC | Age: 43
End: 2023-03-30
Payer: MEDICAID

## 2023-03-30 VITALS
DIASTOLIC BLOOD PRESSURE: 71 MMHG | HEART RATE: 77 BPM | SYSTOLIC BLOOD PRESSURE: 104 MMHG | OXYGEN SATURATION: 97 % | HEIGHT: 65 IN | WEIGHT: 157 LBS | RESPIRATION RATE: 16 BRPM | BODY MASS INDEX: 26.16 KG/M2 | TEMPERATURE: 98 F

## 2023-03-30 DIAGNOSIS — R31.9 HEMATURIA, UNSPECIFIED TYPE: ICD-10-CM

## 2023-03-30 DIAGNOSIS — R35.0 URINARY FREQUENCY: Primary | ICD-10-CM

## 2023-03-30 LAB
BILIRUB UR QL STRIP: NEGATIVE
GLUCOSE UR QL STRIP: NEGATIVE
KETONES UR QL STRIP: NEGATIVE
LEUKOCYTE ESTERASE UR QL STRIP: NEGATIVE
PH, POC UA: 5.5 (ref 5–8)
POC BLOOD, URINE: POSITIVE
POC NITRATES, URINE: NEGATIVE
PROT UR QL STRIP: NEGATIVE
SP GR UR STRIP: 1.02 (ref 1–1.03)
UROBILINOGEN UR STRIP-ACNC: ABNORMAL (ref 0.1–1.1)

## 2023-03-30 PROCEDURE — 81003 POCT URINALYSIS, DIPSTICK, AUTOMATED, W/O SCOPE: ICD-10-PCS | Mod: QW,S$GLB,, | Performed by: NURSE PRACTITIONER

## 2023-03-30 PROCEDURE — 99214 PR OFFICE/OUTPT VISIT, EST, LEVL IV, 30-39 MIN: ICD-10-PCS | Mod: S$GLB,,, | Performed by: NURSE PRACTITIONER

## 2023-03-30 PROCEDURE — 99214 OFFICE O/P EST MOD 30 MIN: CPT | Mod: S$GLB,,, | Performed by: NURSE PRACTITIONER

## 2023-03-30 PROCEDURE — 81003 URINALYSIS AUTO W/O SCOPE: CPT | Mod: QW,S$GLB,, | Performed by: NURSE PRACTITIONER

## 2023-03-30 PROCEDURE — 87086 URINE CULTURE/COLONY COUNT: CPT | Performed by: NURSE PRACTITIONER

## 2023-03-30 RX ORDER — NITROFURANTOIN 25; 75 MG/1; MG/1
100 CAPSULE ORAL 2 TIMES DAILY
Qty: 10 CAPSULE | Refills: 0 | Status: SHIPPED | OUTPATIENT
Start: 2023-03-30 | End: 2023-04-04

## 2023-03-30 RX ORDER — PHENAZOPYRIDINE HYDROCHLORIDE 100 MG/1
100 TABLET, FILM COATED ORAL 3 TIMES DAILY PRN
Qty: 10 TABLET | Refills: 0 | Status: SHIPPED | OUTPATIENT
Start: 2023-03-30 | End: 2023-04-02

## 2023-03-30 RX ORDER — LURASIDONE HYDROCHLORIDE 120 MG/1
1 TABLET, FILM COATED ORAL NIGHTLY
COMMUNITY
Start: 2023-03-27

## 2023-03-30 RX ORDER — TOPIRAMATE 25 MG/1
25 TABLET ORAL
COMMUNITY
Start: 2023-03-11

## 2023-03-30 RX ORDER — LURASIDONE HYDROCHLORIDE 40 MG/1
40 TABLET, FILM COATED ORAL NIGHTLY
COMMUNITY
Start: 2023-03-11

## 2023-03-30 NOTE — PROGRESS NOTES
"Subjective:      Patient ID: Modesta Perez is a 42 y.o. female.    Vitals:  height is 5' 5" (1.651 m) and weight is 71.2 kg (157 lb). Her oral temperature is 98.2 °F (36.8 °C). Her blood pressure is 104/71 and her pulse is 77. Her respiration is 16 and oxygen saturation is 97%.     Chief Complaint: Urinary Tract Infection    Pt believes that after she was drinking Tea that caused her to have urinary problems also the pt said that she doesn't really drink water.    Urinary Tract Infection   This is a new problem. Episode onset: Tuesday. The problem occurs every urination. The problem has been gradually worsening. The quality of the pain is described as aching. The pain is at a severity of 8/10. The pain is moderate. There has been no fever. She is Sexually active. There is No history of pyelonephritis. Associated symptoms include chills, frequency, constipation and withholding. Pertinent negatives include no behavior changes, discharge, flank pain, hematuria, hesitancy, nausea, possible pregnancy, sweats, urgency, vomiting, weight loss, bubble bath use or rash. Treatments tried: Azo Urinary Tract Defense Antibaterial protection,drinking water. The treatment provided no relief. There is no history of catheterization, diabetes insipidus, diabetes mellitus, genitourinary reflux, hypertension, kidney stones, recurrent UTIs, a single kidney, STD, urinary stasis or a urological procedure.     Constitution: Positive for chills.   Gastrointestinal:  Positive for constipation. Negative for nausea and vomiting.   Genitourinary:  Positive for frequency. Negative for urgency, flank pain and hematuria.   Skin:  Negative for rash.    Objective:     Physical Exam   Constitutional:  Non-toxic appearance. She does not appear ill. No distress. normal  HENT:   Head: Normocephalic.   Ears:   Right Ear: Tympanic membrane normal.   Nose: Nose normal.   Pulmonary/Chest: No respiratory distress. She has no wheezes. She has no rales. "   Abdominal: Normal appearance.   Musculoskeletal: Normal range of motion.         General: Normal range of motion.   Neurological: She is alert and at baseline.   Skin: Skin is warm.   Nursing note and vitals reviewed.    Assessment:     1. Urinary frequency    2. Hematuria, unspecified type      Results for orders placed or performed in visit on 03/30/23   POCT Urinalysis, Dipstick, Automated, W/O Scope   Result Value Ref Range    POC Blood, Urine Positive (A) Negative    POC Bilirubin, Urine Negative Negative    POC Urobilinogen, Urine NORM 0.1 - 1.1    POC Ketones, Urine Negative Negative    POC Protein, Urine Negative Negative    POC Nitrates, Urine Negative Negative    POC Glucose, Urine Negative Negative    pH, UA 5.5 5 - 8    POC Specific Gravity, Urine 1.020 1.003 - 1.029    POC Leukocytes, Urine Negative Negative       Plan:       Urinary frequency  -     POCT Urinalysis, Dipstick, Automated, W/O Scope    Hematuria, unspecified type  -     Ambulatory referral/consult to Urology    Other orders  -     nitrofurantoin, macrocrystal-monohydrate, (MACROBID) 100 MG capsule; Take 1 capsule (100 mg total) by mouth 2 (two) times daily. for 5 days  Dispense: 10 capsule; Refill: 0  -     phenazopyridine (PYRIDIUM) 100 MG tablet; Take 1 tablet (100 mg total) by mouth 3 (three) times daily as needed for Pain.  Dispense: 10 tablet; Refill: 0

## 2023-03-30 NOTE — PATIENT INSTRUCTIONS
Take medications as directed  Followup with primary care or urology in 2-3 days  Go to emergency department if you develop fever,back pain or worsening symptoms

## 2023-03-30 NOTE — LETTER
March 30, 2023      [facility]  [address], [address]  [place] [address]  Phone: [phone]  Fax: [phone]       Patient: Modesta Perez   YOB: 1980  Date of Visit: 03/30/2023    To Whom It May Concern:    Donny Perez  was at [facility] on 03/30/2023. The patient may return to work/school on 4/3/2023with no restrictions. If you have any questions or concerns, or if I can be of further assistance, please do not hesitate to contact me.    Sincerely,    Bárbara Novoa, NP

## 2023-04-02 LAB
BACTERIA UR CULT: NORMAL
BACTERIA UR CULT: NORMAL

## 2023-09-09 ENCOUNTER — OFFICE VISIT (OUTPATIENT)
Dept: URGENT CARE | Facility: CLINIC | Age: 43
End: 2023-09-09
Payer: MEDICAID

## 2023-09-09 VITALS
SYSTOLIC BLOOD PRESSURE: 102 MMHG | BODY MASS INDEX: 25.83 KG/M2 | WEIGHT: 155 LBS | OXYGEN SATURATION: 98 % | TEMPERATURE: 98 F | RESPIRATION RATE: 16 BRPM | HEART RATE: 73 BPM | DIASTOLIC BLOOD PRESSURE: 70 MMHG | HEIGHT: 65 IN

## 2023-09-09 DIAGNOSIS — R30.0 DYSURIA: Primary | ICD-10-CM

## 2023-09-09 LAB
BILIRUB UR QL STRIP: NEGATIVE
GLUCOSE UR QL STRIP: NEGATIVE
KETONES UR QL STRIP: NEGATIVE
LEUKOCYTE ESTERASE UR QL STRIP: NEGATIVE
PH, POC UA: 5 (ref 5–8)
POC BLOOD, URINE: POSITIVE
POC NITRATES, URINE: NEGATIVE
PROT UR QL STRIP: NEGATIVE
SP GR UR STRIP: 1.02 (ref 1–1.03)
UROBILINOGEN UR STRIP-ACNC: NORMAL (ref 0.1–1.1)

## 2023-09-09 PROCEDURE — 99214 OFFICE O/P EST MOD 30 MIN: CPT | Mod: S$GLB,,, | Performed by: NURSE PRACTITIONER

## 2023-09-09 PROCEDURE — 81003 POCT URINALYSIS, DIPSTICK, AUTOMATED, W/O SCOPE: ICD-10-PCS | Mod: QW,S$GLB,, | Performed by: NURSE PRACTITIONER

## 2023-09-09 PROCEDURE — 99214 PR OFFICE/OUTPT VISIT, EST, LEVL IV, 30-39 MIN: ICD-10-PCS | Mod: S$GLB,,, | Performed by: NURSE PRACTITIONER

## 2023-09-09 PROCEDURE — 81003 URINALYSIS AUTO W/O SCOPE: CPT | Mod: QW,S$GLB,, | Performed by: NURSE PRACTITIONER

## 2023-09-09 RX ORDER — PHENAZOPYRIDINE HYDROCHLORIDE 100 MG/1
100 TABLET, FILM COATED ORAL 3 TIMES DAILY PRN
Qty: 12 TABLET | Refills: 0 | Status: SHIPPED | OUTPATIENT
Start: 2023-09-09 | End: 2023-09-09

## 2023-09-09 RX ORDER — NITROFURANTOIN 25; 75 MG/1; MG/1
100 CAPSULE ORAL 2 TIMES DAILY
Qty: 10 CAPSULE | Refills: 0 | Status: SHIPPED | OUTPATIENT
Start: 2023-09-09 | End: 2023-09-09

## 2023-09-09 RX ORDER — PHENAZOPYRIDINE HYDROCHLORIDE 100 MG/1
100 TABLET, FILM COATED ORAL 3 TIMES DAILY PRN
Qty: 12 TABLET | Refills: 0 | Status: SHIPPED | OUTPATIENT
Start: 2023-09-09 | End: 2023-09-13

## 2023-09-09 RX ORDER — NITROFURANTOIN 25; 75 MG/1; MG/1
100 CAPSULE ORAL 2 TIMES DAILY
Qty: 10 CAPSULE | Refills: 0 | Status: SHIPPED | OUTPATIENT
Start: 2023-09-09 | End: 2023-09-14

## 2023-09-09 NOTE — PATIENT INSTRUCTIONS
"*Urinary Tract Infections*  1) Avoid tub baths.  2) Always urinate after intercourse(Teens/Adults).  3) Avoid "Fizzy" drinks/soda drinks.  4) Always wipe from front to back.  5) Wear only cotton underwear.  6) Drink a lot of fluids (at least 8-10 glasses of water) for 5 to 7 days to help flush your kidneys. You can also drink 1 shot-sized glass of cranberry juice 3X daily over the next several days to help cleanse your bladder, but studies show that cranberry juice does not cure or prevent a UTI.   7) Take all medications as directed. Make sure to complete all antibiotics as prescribed.    8) For patients above 6 months of age who are not allergic to and are not on anticoagulants, you can alternate Tylenol and Motrin every 4-6 hours for fever above 100.4F and/or pain.  For patients less than 6 months of age, allergic to or intolerant to NSAIDS, have gastritis, gastric ulcers, or history of GI bleeds, are pregnant, or are on anticoagulant therapy, you can take Tylenol every 4 hours as needed for fever above 100.4F and/or pain.   9) You should schedule a follow-up appointment with your Primary Care Provider/Pediatrician for recheck in 2-3 days or as directed at this visit.   10) If your condition fails to improve in a timely manner, you should receive another evaluation by your Primary Care Provider/Pediatrician to discuss your concerns or return to urgent care for a recheck.  If your condition worsens at any time, you should report immediately to your nearest Emergency Department for further evaluation. **You must understand that you have received Urgent Care treatment only and that you may be released before all of your medical problems are known or treated. You, the patient, are responsible to arrange for follow-up care as instructed.                 "

## 2023-09-09 NOTE — PROGRESS NOTES
"Subjective:      Patient ID: Modesta Corona is a 42 y.o. female.    Vitals:  height is 5' 5" (1.651 m) and weight is 70.3 kg (155 lb). Her oral temperature is 98.4 °F (36.9 °C). Her blood pressure is 102/70 and her pulse is 73. Her respiration is 16 and oxygen saturation is 98%.     Chief Complaint: Dysuria    PT presents today with dysuria x 1 day.     Dysuria   This is a new problem. The current episode started yesterday. The problem occurs every urination. The problem has been gradually worsening. The quality of the pain is described as burning and aching. The pain is at a severity of 7/10. The pain is moderate. There has been no fever. She is Sexually active. There is No history of pyelonephritis. Associated symptoms include frequency. Pertinent negatives include no urgency. Treatments tried: AZO. Her past medical history is significant for recurrent UTIs.       Constitution: Negative.   Neck: neck negative.   Cardiovascular: Negative.    Respiratory: Negative.     Genitourinary:  Positive for dysuria, frequency and pelvic pain. Negative for urgency.      Objective:     Physical Exam   Constitutional: She is oriented to person, place, and time. She appears well-developed. She is cooperative.   HENT:   Head: Normocephalic and atraumatic.   Ears:   Right Ear: Hearing, tympanic membrane and external ear normal.   Left Ear: Hearing and external ear normal.   Nose: Nose normal. No mucosal edema or nasal deformity. No epistaxis. Right sinus exhibits no maxillary sinus tenderness and no frontal sinus tenderness. Left sinus exhibits no maxillary sinus tenderness and no frontal sinus tenderness.   Mouth/Throat: Uvula is midline, oropharynx is clear and moist and mucous membranes are normal. No trismus in the jaw. Normal dentition. No uvula swelling.   Eyes: Conjunctivae and lids are normal.   Neck: Trachea normal and phonation normal. Neck supple.   Cardiovascular: Normal rate, regular rhythm, normal heart " "sounds and normal pulses.   Pulmonary/Chest: Effort normal and breath sounds normal.   Abdominal: Normal appearance and bowel sounds are normal. Soft.   Musculoskeletal: Normal range of motion.         General: Normal range of motion.   Neurological: She is alert and oriented to person, place, and time. She exhibits normal muscle tone.   Skin: Skin is warm, dry and intact.   Psychiatric: Her speech is normal and behavior is normal. Judgment and thought content normal.   Nursing note and vitals reviewed.      Assessment:     1. Dysuria        Plan:       Dysuria  -     POCT Urinalysis, Dipstick, Automated, W/O Scope  -     nitrofurantoin, macrocrystal-monohydrate, (MACROBID) 100 MG capsule; Take 1 capsule (100 mg total) by mouth 2 (two) times daily. for 5 days  Dispense: 10 capsule; Refill: 0  -     phenazopyridine (PYRIDIUM) 100 MG tablet; Take 1 tablet (100 mg total) by mouth 3 (three) times daily as needed for Pain.  Dispense: 12 tablet; Refill: 0      Results for orders placed or performed in visit on 09/09/23   POCT Urinalysis, Dipstick, Automated, W/O Scope   Result Value Ref Range    POC Blood, Urine Positive (A) Negative    POC Bilirubin, Urine Negative Negative    POC Urobilinogen, Urine normal 0.1 - 1.1    POC Ketones, Urine Negative Negative    POC Protein, Urine Negative Negative    POC Nitrates, Urine Negative Negative    POC Glucose, Urine Negative Negative    pH, UA 5.0 5 - 8    POC Specific Gravity, Urine 1.025 1.003 - 1.029    POC Leukocytes, Urine Negative Negative       Patient Instructions   *Urinary Tract Infections*  1) Avoid tub baths.  2) Always urinate after intercourse(Teens/Adults).  3) Avoid "Fizzy" drinks/soda drinks.  4) Always wipe from front to back.  5) Wear only cotton underwear.  6) Drink a lot of fluids (at least 8-10 glasses of water) for 5 to 7 days to help flush your kidneys. You can also drink 1 shot-sized glass of cranberry juice 3X daily over the next several days to help " cleanse your bladder, but studies show that cranberry juice does not cure or prevent a UTI.   7) Take all medications as directed. Make sure to complete all antibiotics as prescribed.    8) For patients above 6 months of age who are not allergic to and are not on anticoagulants, you can alternate Tylenol and Motrin every 4-6 hours for fever above 100.4F and/or pain.  For patients less than 6 months of age, allergic to or intolerant to NSAIDS, have gastritis, gastric ulcers, or history of GI bleeds, are pregnant, or are on anticoagulant therapy, you can take Tylenol every 4 hours as needed for fever above 100.4F and/or pain.   9) You should schedule a follow-up appointment with your Primary Care Provider/Pediatrician for recheck in 2-3 days or as directed at this visit.   10) If your condition fails to improve in a timely manner, you should receive another evaluation by your Primary Care Provider/Pediatrician to discuss your concerns or return to urgent care for a recheck.  If your condition worsens at any time, you should report immediately to your nearest Emergency Department for further evaluation. **You must understand that you have received Urgent Care treatment only and that you may be released before all of your medical problems are known or treated. You, the patient, are responsible to arrange for follow-up care as instructed.

## 2023-12-29 ENCOUNTER — OFFICE VISIT (OUTPATIENT)
Dept: URGENT CARE | Facility: CLINIC | Age: 43
End: 2023-12-29
Payer: MEDICAID

## 2023-12-29 VITALS
BODY MASS INDEX: 25.83 KG/M2 | DIASTOLIC BLOOD PRESSURE: 80 MMHG | HEART RATE: 69 BPM | HEIGHT: 65 IN | SYSTOLIC BLOOD PRESSURE: 119 MMHG | WEIGHT: 155 LBS | RESPIRATION RATE: 18 BRPM | TEMPERATURE: 98 F | OXYGEN SATURATION: 96 %

## 2023-12-29 DIAGNOSIS — J01.40 ACUTE PANSINUSITIS, RECURRENCE NOT SPECIFIED: ICD-10-CM

## 2023-12-29 DIAGNOSIS — J02.9 SORE THROAT: Primary | ICD-10-CM

## 2023-12-29 LAB
CTP QC/QA: YES
MOLECULAR STREP A: NEGATIVE
POC MOLECULAR INFLUENZA A AGN: NEGATIVE
POC MOLECULAR INFLUENZA B AGN: NEGATIVE
SARS-COV-2 AG RESP QL IA.RAPID: NEGATIVE

## 2023-12-29 PROCEDURE — 87651 STREP A DNA AMP PROBE: CPT | Mod: QW,S$GLB,, | Performed by: NURSE PRACTITIONER

## 2023-12-29 PROCEDURE — 99214 OFFICE O/P EST MOD 30 MIN: CPT | Mod: S$GLB,,, | Performed by: NURSE PRACTITIONER

## 2023-12-29 PROCEDURE — 87811 SARS-COV-2 COVID19 W/OPTIC: CPT | Mod: QW,S$GLB,, | Performed by: NURSE PRACTITIONER

## 2023-12-29 PROCEDURE — 87651 POCT STREP A MOLECULAR: ICD-10-PCS | Mod: QW,S$GLB,, | Performed by: NURSE PRACTITIONER

## 2023-12-29 PROCEDURE — 87502 INFLUENZA DNA AMP PROBE: CPT | Mod: QW,S$GLB,, | Performed by: NURSE PRACTITIONER

## 2023-12-29 PROCEDURE — 99214 PR OFFICE/OUTPT VISIT, EST, LEVL IV, 30-39 MIN: ICD-10-PCS | Mod: S$GLB,,, | Performed by: NURSE PRACTITIONER

## 2023-12-29 PROCEDURE — 87502 POCT INFLUENZA A/B MOLECULAR: ICD-10-PCS | Mod: QW,S$GLB,, | Performed by: NURSE PRACTITIONER

## 2023-12-29 PROCEDURE — 87811 SARS CORONAVIRUS 2 ANTIGEN POCT, MANUAL READ: ICD-10-PCS | Mod: QW,S$GLB,, | Performed by: NURSE PRACTITIONER

## 2023-12-29 RX ORDER — AZITHROMYCIN 250 MG/1
TABLET, FILM COATED ORAL
Qty: 6 TABLET | Refills: 0 | Status: SHIPPED | OUTPATIENT
Start: 2023-12-29 | End: 2024-01-03

## 2023-12-29 RX ORDER — METHYLPREDNISOLONE 4 MG/1
TABLET ORAL
Qty: 21 EACH | Refills: 0 | Status: SHIPPED | OUTPATIENT
Start: 2023-12-29 | End: 2024-01-19

## 2023-12-29 RX ORDER — METHYLPREDNISOLONE 4 MG/1
TABLET ORAL
Qty: 21 EACH | Refills: 0 | Status: SHIPPED | OUTPATIENT
Start: 2023-12-29 | End: 2023-12-29

## 2023-12-29 NOTE — LETTER
December 29, 2023      [facility]  [address], [address]  [place] [address]  Phone: [phone]  Fax: [phone]       Patient: Modesta Perez   YOB: 1980  Date of Visit: 12/29/2023    To Whom It May Concern:    Donny Perez  was at [facility] on 12/29/2023. The patient may return to work/school on 12/1/2023 with no restrictions. If you have any questions or concerns, or if I can be of further assistance, please do not hesitate to contact me.    Sincerely,    Bárbara Novoa, NP

## 2023-12-29 NOTE — PROGRESS NOTES
"Subjective:      Patient ID: Modesta Perez is a 43 y.o. female.    Vitals:  height is 5' 5" (1.651 m) and weight is 70.3 kg (155 lb). Her oral temperature is 97.5 °F (36.4 °C). Her blood pressure is 119/80 and her pulse is 69. Her respiration is 18 and oxygen saturation is 96%.     Chief Complaint: Sore Throat    Pt is coming in with a sore throat and headache that began yesterday.     Sore Throat   This is a new problem. The current episode started yesterday. The problem has been unchanged. Neither side of throat is experiencing more pain than the other. There has been no fever. The pain is at a severity of 7/10. The pain is moderate. Associated symptoms include congestion, headaches and trouble swallowing. Pertinent negatives include no coughing or ear pain. She has had no exposure to mono. Treatments tried: dayquill and cough drops. The treatment provided no relief.       HENT:  Positive for congestion, sore throat and trouble swallowing. Negative for ear pain.    Respiratory:  Negative for cough.    Neurological:  Positive for headaches.      Objective:     Physical Exam   Constitutional: She is oriented to person, place, and time. normal  HENT:   Head: Normocephalic.   Ears:   Right Ear: Tympanic membrane normal.   Nose: Congestion present.   Cardiovascular: Normal rate.   Pulmonary/Chest: Effort normal and breath sounds normal.   Abdominal: Normal appearance and bowel sounds are normal.   Musculoskeletal: Normal range of motion.         General: Normal range of motion.   Neurological: no focal deficit. She is alert, oriented to person, place, and time and at baseline.   Skin: Skin is warm. Capillary refill takes less than 2 seconds.   Nursing note and vitals reviewed.    Assessment:     1. Sore throat    2. Acute pansinusitis, recurrence not specified      Results for orders placed or performed in visit on 12/29/23   POCT Strep A, Molecular   Result Value Ref Range    Molecular Strep A, POC Negative " Negative     Acceptable Yes    SARS Coronavirus 2 Antigen, POCT Manual Read   Result Value Ref Range    SARS Coronavirus 2 Antigen Negative Negative     Acceptable Yes    POCT Influenza A/B MOLECULAR   Result Value Ref Range    POC Molecular Influenza A Ag Negative Negative, Not Reported    POC Molecular Influenza B Ag Negative Negative, Not Reported     Acceptable Yes        Plan:       Sore throat  -     POCT Strep A, Molecular  -     SARS Coronavirus 2 Antigen, POCT Manual Read  -     POCT Influenza A/B MOLECULAR    Acute pansinusitis, recurrence not specified    Other orders  -     azithromycin (Z-DAVID) 250 MG tablet; Take 2 tablets by mouth on day 1; Take 1 tablet by mouth on days 2-5  Dispense: 6 tablet; Refill: 0  -     methylPREDNISolone (MEDROL DOSEPACK) 4 mg tablet; use as directed  Dispense: 21 each; Refill: 0

## 2025-02-21 ENCOUNTER — OFFICE VISIT (OUTPATIENT)
Dept: URGENT CARE | Facility: CLINIC | Age: 45
End: 2025-02-21
Payer: MEDICAID

## 2025-02-21 VITALS
BODY MASS INDEX: 26.28 KG/M2 | DIASTOLIC BLOOD PRESSURE: 73 MMHG | SYSTOLIC BLOOD PRESSURE: 110 MMHG | RESPIRATION RATE: 18 BRPM | HEART RATE: 81 BPM | WEIGHT: 157.75 LBS | OXYGEN SATURATION: 98 % | HEIGHT: 65 IN | TEMPERATURE: 98 F

## 2025-02-21 DIAGNOSIS — R52 BODY ACHES: ICD-10-CM

## 2025-02-21 DIAGNOSIS — U07.1 COVID-19: Primary | ICD-10-CM

## 2025-02-21 LAB
CTP QC/QA: YES
SARS CORONAVIRUS 2 ANTIGEN: POSITIVE

## 2025-02-21 RX ORDER — IPRATROPIUM BROMIDE 21 UG/1
1 SPRAY, METERED NASAL 2 TIMES DAILY
Qty: 30 ML | Refills: 0 | Status: SHIPPED | OUTPATIENT
Start: 2025-02-21 | End: 2025-02-25

## 2025-02-21 RX ORDER — BUPROPION HYDROCHLORIDE 150 MG/1
150 TABLET ORAL
COMMUNITY
Start: 2025-02-15

## 2025-02-21 RX ORDER — BENZONATATE 200 MG/1
200 CAPSULE ORAL 3 TIMES DAILY PRN
Qty: 21 CAPSULE | Refills: 0 | Status: SHIPPED | OUTPATIENT
Start: 2025-02-21 | End: 2025-03-03

## 2025-02-21 RX ORDER — PROMETHAZINE HYDROCHLORIDE AND DEXTROMETHORPHAN HYDROBROMIDE 6.25; 15 MG/5ML; MG/5ML
5 SYRUP ORAL EVERY 4 HOURS PRN
Qty: 180 ML | Refills: 0 | Status: SHIPPED | OUTPATIENT
Start: 2025-02-21 | End: 2025-03-03

## 2025-02-21 NOTE — PROGRESS NOTES
"Subjective:      Patient ID: Modesta Perez is a 44 y.o. female.    Vitals:  height is 5' 5" (1.651 m) and weight is 71.6 kg (157 lb 11.8 oz). Her oral temperature is 97.5 °F (36.4 °C). Her blood pressure is 110/73 and her pulse is 81. Her respiration is 18 and oxygen saturation is 98%.     Chief Complaint: Headache    Patient complaining of a headache and body aches. Started Monday with a sore throat and a cough Now she is just having a headache and body aches     Headache   This is a new problem. Episode onset: Monday. The problem occurs constantly. The problem has been unchanged. The pain is located in the Frontal region. The pain radiates to the face. The quality of the pain is described as pulsating. The pain is at a severity of 8/10. The pain is moderate. Associated symptoms include coughing, muscle aches, sinus pressure and a sore throat. Pertinent negatives include no fever or nausea.       Constitution: Negative for fever.   HENT:  Positive for sinus pressure and sore throat.    Respiratory:  Positive for cough.    Gastrointestinal:  Negative for nausea.   Neurological:  Positive for headaches.      Objective:     Physical Exam   Constitutional:  Non-toxic appearance. She does not appear ill. No distress.   HENT:   Head: Normocephalic and atraumatic.   Ears:   Right Ear: Tympanic membrane, external ear and ear canal normal.   Left Ear: Tympanic membrane, external ear and ear canal normal.   Nose: Rhinorrhea and congestion present.   Mouth/Throat: Uvula is midline and oropharynx is clear and moist. Mucous membranes are moist. No posterior oropharyngeal edema or posterior oropharyngeal erythema. Oropharynx is clear.   Eyes: Conjunctivae are normal.   Neck: Neck supple. No neck rigidity present.   Cardiovascular: Normal rate and regular rhythm.   Pulmonary/Chest: Effort normal. No respiratory distress. She has no wheezes.   Abdominal: Normal appearance.   Neurological: no focal deficit. She is alert. " "  Skin: Skin is warm, dry and not diaphoretic.   Psychiatric: Her behavior is normal. Judgment and thought content normal.   Nursing note and vitals reviewed.    Results for orders placed or performed in visit on 02/21/25   SARS Coronavirus 2 Antigen, POCT Manual Read    Collection Time: 02/21/25 10:15 AM   Result Value Ref Range    SARS Coronavirus 2 Antigen Positive (A) Negative, Presumptive Negative     Acceptable Yes          Assessment:     1. COVID-19    2. Body aches        Plan:       COVID-19  -     ipratropium (ATROVENT) 21 mcg (0.03 %) nasal spray; 1 spray by Each Nostril route 2 (two) times daily. for 4 days  Dispense: 30 mL; Refill: 0  -     benzonatate (TESSALON) 200 MG capsule; Take 1 capsule (200 mg total) by mouth 3 (three) times daily as needed for Cough.  Dispense: 21 capsule; Refill: 0  -     promethazine-dextromethorphan (PROMETHAZINE-DM) 6.25-15 mg/5 mL Syrp; Take 5 mLs by mouth every 4 (four) hours as needed (for cough).  Dispense: 180 mL; Refill: 0    Body aches  -     SARS Coronavirus 2 Antigen, POCT Manual Read    Covid positive, discussed results with patient.  COVID-19    If you have COVID-19, stay home while you are sick, rest, and drink plenty of fluids. Treat any bothersome symptoms with over-the-counter medication.    Treatment depends on your age, health, and symptoms. Most people with mild symptoms can rest at home until they get better. "Mild" means that you might have symptoms like fever, cough, or other cold symptoms, but you do not have trouble breathing. Sometimes, it can take about 2 weeks for symptoms to improve, but it's not the same for everyone.    Doctors do recommend treatment to lower the risk of getting sicker for people who are at risk for getting seriously ill. This includes:  · Adults 65 years or older  · Adults who have certain health conditions such as diabetes, heart or lung disease, chronic kidney disease, and obesity.  · Adults 50 years or older " "who have not been vaccinated.    The medication most often used is nirmatrelvir-ritonavir (Paxlovid)  Molnupiravir may be prescribed if you cannot take Paxlovid.  If your doctor suggests treatment with Paxlovid, it's important to know:  ?Paxlovid comes as several pills that you take for 5 days.  ?Treatment should be started within 5 days after symptoms begin. This is why it's important to test early so you know if you have COVID-19 as soon as possible.  ?Before prescribing Paxlovid, your doctor should review any other medicines and supplements that you take. In some cases, they might want to change or stop your other medicines while you take Paxlovid.    Some people who are treated with Paxlovid get something called "viral rebound." This means that they start testing negative after having COVID-19, but then test positive again. Symptoms might also come back, though they are almost always mild. If you are at risk for serious illness, the benefits of treatment are still greater than the risk of viral rebound.    If your condition fails to improve in a timely manner, you should receive another evaluation by your Primary Care Provider to discuss your concerns or return to urgent care for a recheck.    Go to the ED if you develop new or worsening symptoms including but not limited to:  You have trouble breathing.  You have severe chest discomfort.  You feel confused or disoriented.  You are throwing up and cant keep liquids down.  You develop signs of fluid loss, such as dark-colored urine and muscle cramps.  Blue lips or face  Symptoms last over 10 days and/or get worse instead of better    The following are guidelines for returning to work/school:  1. No fever for 24 hours without use of fever-reducing medication.  2. Overall symptoms have improved for 24 hours (No new or worsening symptoms).  Additional prevention strategies are encouraged for the next 5 days to curb disease spread by enhancing hygiene practices such " as good handwashing, use of hand  and wearing a well-fitting mask    **You must understand that you have received Urgent Care treatment only and that you may be released before all your medical problems are known or treated. You, the patient, are responsible to arrange for follow-up care as instructed.

## 2025-02-21 NOTE — LETTER
February 21, 2025      [facility] [place]  [address], [address]  [place] [address]  Phone: [phone]  Fax: [phone]       Patient: Modesta Perez   YOB: 1980  Date of Visit: 02/21/2025    To Whom It May Concern:    Donny Perez  was at [facility] on 02/21/2025. The patient may return to work/school in 2-3 days with no restrictions. If you have any questions or concerns, or if I can be of further assistance, please do not hesitate to contact me.    Sincerely,    Lala Keita PA-C